# Patient Record
Sex: MALE | Race: WHITE | URBAN - METROPOLITAN AREA
[De-identification: names, ages, dates, MRNs, and addresses within clinical notes are randomized per-mention and may not be internally consistent; named-entity substitution may affect disease eponyms.]

---

## 2017-02-15 ENCOUNTER — TELEPHONE (OUTPATIENT)
Dept: ENDOCRINOLOGY | Age: 32
End: 2017-02-15

## 2017-02-15 DIAGNOSIS — E10.65 POOR CONTROL TYPE I DIABETES MELLITUS (HCC): Primary | ICD-10-CM

## 2017-02-15 DIAGNOSIS — E78.5 HYPERLIPIDEMIA, UNSPECIFIED HYPERLIPIDEMIA TYPE: ICD-10-CM

## 2017-02-15 NOTE — TELEPHONE ENCOUNTER
----- Message from Kailash Tang MD sent at 2/15/2017  8:50 AM EST -----      ----- Message -----     From: Kailash Tang MD     Sent: 2/15/2017       To: Kailash Tang MD    Mail him a lab slip

## 2017-03-07 ENCOUNTER — TELEPHONE (OUTPATIENT)
Dept: ENDOCRINOLOGY | Age: 32
End: 2017-03-07

## 2017-03-07 NOTE — TELEPHONE ENCOUNTER
----- Message from Norma Rosen sent at 3/6/2017  5:51 PM EST -----  Regarding: /Prisca   Pt needs a new Rx insulin pump supplies sent to [facility]. The best number for the pt [phone].

## 2017-03-07 NOTE — TELEPHONE ENCOUNTER
Informed patient that I had called him to let him know his pump supplies were taken care of by Dr. Lilia Chapa.

## 2017-03-07 NOTE — TELEPHONE ENCOUNTER
Informed patient by voicemail that his pump supplies were taken care of and that he could  call back if he has any questions.

## 2017-03-16 ENCOUNTER — TELEPHONE (OUTPATIENT)
Dept: ENDOCRINOLOGY | Age: 32
End: 2017-03-16

## 2017-03-16 NOTE — TELEPHONE ENCOUNTER
----- Message from Rebekah Murry sent at 3/15/2017  7:21 PM EDT -----  Regarding: Dr. Sandy Major  Patient called to reschedule appointment but none was available until August, 2017. Patient is requesting a returned call back to schedule an earlier appointment. Best contact number for patient is [phone].

## 2017-03-16 NOTE — TELEPHONE ENCOUNTER
3/16/2017  4:25 PM    I sent Mr. Burnett a Jovie message at 9:52am stating to call the office at [phone] option-1 the message was read at 10:19am as of 4:29pm no call  from Mr. Burnett. Unable to leave a  because mailbox is full. Here are the times I tried calling Mr. Burnett  9:50am  10:29am  12:09pm  2:56pm  4:21pm    ThanksQiana

## 2017-03-16 NOTE — TELEPHONE ENCOUNTER
----- Message from Maxwell Rigo sent at 3/15/2017  7:21 PM EDT -----  Regarding: Dr. Harris   Patient called to reschedule appointment but none was available until August, 2017. Patient is requesting a returned call back to schedule an earlier appointment. Best contact number for patient is [phone].

## 2017-03-16 NOTE — TELEPHONE ENCOUNTER
Please schedule him for a Tuesday morning between 8:30-9:30 or a 12:10 slot anytime in the next few weeks aside from 4/11/17 at 8:30.

## 2017-03-20 RX ORDER — INSULIN ASPART 100 [IU]/ML
INJECTION, SOLUTION INTRAVENOUS; SUBCUTANEOUS
Qty: 70 ML | Refills: 3 | Status: SHIPPED | OUTPATIENT
Start: 2017-03-20 | End: 2018-04-23 | Stop reason: SDUPTHER

## 2017-04-11 ENCOUNTER — OFFICE VISIT (OUTPATIENT)
Dept: ENDOCRINOLOGY | Age: 32
End: 2017-04-11

## 2017-04-11 VITALS
HEIGHT: 73 IN | SYSTOLIC BLOOD PRESSURE: 106 MMHG | BODY MASS INDEX: 25.62 KG/M2 | DIASTOLIC BLOOD PRESSURE: 55 MMHG | WEIGHT: 193.3 LBS | HEART RATE: 71 BPM

## 2017-04-11 DIAGNOSIS — E78.5 OTHER AND UNSPECIFIED HYPERLIPIDEMIA: ICD-10-CM

## 2017-04-11 NOTE — PROGRESS NOTES
Chief Complaint   Patient presents with    Diabetes     pcp and pharmacy confirmed     History of Present Illness: Desiree Black is a 34 y.o. male here for follow up of diabetes. Weight up 5 lbs since last visit in 11/16. Current settings are as follows:  - basal: 12a: 1.65, 4a: 1.35  - Carb ratio: 10  - sensitivity: 12a: 40, 6a: 25, 10p: 40  - target:   - active insulin time: 4 hours    Didn't make any other changes to his pump since last visit. Does think he has had more foods higher in cholesterol like fast food since last visit to explain the rise in cholesterol. Had been eating more protein this winter but has gotten off this the past 2 months. Has been checking some days up to 4 times a day but still can go 2-3 days without checking at all but will just bolus. Does have some evidence of good readings in the 130-180 range when he checks several times a day but still has spikes in the 200-400 range frequently that are keeping up his A1c. No pattern of lows. Never got a chance to make an eye appt so gave him contact info again for Dr. Heidi Pichardo. Current Outpatient Prescriptions   Medication Sig    NOVOLOG 100 unit/mL injection USE AS DIRECTED FOR INSULIN PUMP. MAX UNITS 75 PER DAY    ONETOUCH ULTRA TEST strip Test up to 4 times daily. Dx: E10.65    SUBCUTANEOUS INSULIN PUMP (MINIMED INSULIN INFUSION PUMP) by Does Not Apply route. No current facility-administered medications for this visit. No Known Allergies     Review of Systems:  - Eyes: no blurry vision or double vision  - Cardiovascular: no chest pain  - Respiratory: no shortness of breath  - Musculoskeletal: no myalgias  - Neurological: no numbness/tingling in extremities    Physical Examination:  Blood pressure 106/55, pulse 71, height 6' 1\" (1.854 m), weight 193 lb 4.8 oz (87.7 kg).   - General: pleasant, no distress, good eye contact   - Neck: no carotid bruits  - Cardiovascular: regular, normal rate, nl s1 and s2, no m/r/g, - Respiratory: clear bilaterally  - Integumentary: no edema,   - Psychiatric: normal mood and affect    Data Reviewed:   - Hgb A1c 8.2%  - lipids: total 234 ,  HDL 59, TG 71,   - ALT 16, AST 20  - BUN/Cr 15/0.74      Assessment/Plan:     1. Type I (juvenile type) diabetes mellitus without mention of complication, uncontrolled ([facility] [address]): his most recent Hgb A1c was 8.2% in 4/17 down from 8.4% in 11/16 down from 8.5% in 7/15 up from 7.9% in 3/15. Overall control is improving but still needs to check more frequently to determine where changes need to be made to his settings. We spent 25 minutes of face to face time together and > 50% of the time was spent in counseling on diabetes management. - cont current pump settings   - check bs 4 times per day due to fluctuating sugars on an insulin pump  - foot exam done 11/16  - optho is now due  - microalbumin nl 11/16  - check A1c and bmp prior to next visit  - his BP was at goal < 140/90 not on any meds  - TSH normal 7/15     2. Other and unspecified hyperlipidemia: Given DM, Goal LDL < 100, non-HDL < 130, and TG < 150.  in 3/15 and 125 in 7/15. Down to 122 in 11/16 but up to 161 in 4/17 but 10 year risk of CV disease is 0.5% so will hold on statin and focus on diet. - diet control for now  - check lipids prior to next visit        Patient Instructions   1) Your Hemoglobin A1c (3 month test of blood sugar) was 8.2% which is the best it's been since July 2015. We'll keep your settings the same. Continue to focus on checking at least 2 times per day and up to 4 times per day to continue to improve your control. If you notice that your sugars are spiking after meals, be sure you are counting carbs correctly but if still running high over 180 after a meal, consider changing the carb ratio to 8. Consider using some prepacked meals to ensure the carb count is correct. 2) Your LDL (bad cholesterol) is 162 and goal is under 100.   Try to limit the amount of fried foods, fatty foods, butter, gravy, red meat, ice cream, cheese and eggs in your diet, which are all high in cholesterol. You will not need any meds for cholesterol at this time. 3) Your liver and kidney tests are normal.    4) I will send you a reminder letter 3-4 weeks prior to your next visit and you will have the order already in the [facility] system so you can just go sometime in the 3-7 days before the next visit to have your labs drawn. Follow-up Disposition:  Return in about 5 months (around 9/11/2017).     Copy sent to:  Dr. Tatum Amos

## 2017-04-11 NOTE — PATIENT INSTRUCTIONS
1) Your Hemoglobin A1c (3 month test of blood sugar) was 8.2% which is the best it's been since July 2015. We'll keep your settings the same. Continue to focus on checking at least 2 times per day and up to 4 times per day to continue to improve your control. If you notice that your sugars are spiking after meals, be sure you are counting carbs correctly but if still running high over 180 after a meal, consider changing the carb ratio to 8. Consider using some prepacked meals to ensure the carb count is correct. 2) Your LDL (bad cholesterol) is 162 and goal is under 100. Try to limit the amount of fried foods, fatty foods, butter, gravy, red meat, ice cream, cheese and eggs in your diet, which are all high in cholesterol. You will not need any meds for cholesterol at this time. 3) Your liver and kidney tests are normal.    4) I will send you a reminder letter 3-4 weeks prior to your next visit and you will have the order already in the [facility] system so you can just go sometime in the 3-7 days before the next visit to have your labs drawn. 5) You can try [facility] at [place] [phone].

## 2017-04-11 NOTE — MR AVS SNAPSHOT
Visit Information Date & Time Provider Department Dept. Phone Encounter #  
 4/11/2017 12:10 PM Robin Quick, [address] [facility] Diabetes and Endocrinology [phone] [id number] Follow-up Instructions Return in about 5 months (around 9/11/2017). Upcoming Health Maintenance Date Due  
 EYE EXAM RETINAL OR DILATED Q1 2/27/1994 Pneumococcal 19-64 Medium Risk (1 of 1 - PPSV23) 2/27/2003 DTaP/Tdap/Td series (1 - Tdap) 2/27/2005 INFLUENZA AGE 9 TO ADULT 8/1/2016 HEMOGLOBIN A1C Q6M 5/11/2017 FOOT EXAM Q1 11/11/2017 MICROALBUMIN Q1 11/11/2017 LIPID PANEL Q1 11/11/2017 Allergies as of 4/11/2017  Review Complete On: 4/11/2017 By: Robin Quick MD  
 No Known Allergies Current Immunizations  Never Reviewed No immunizations on file. Not reviewed this visit Vitals BP Pulse Height(growth percentile) Weight(growth percentile) BMI Smoking Status 106/55 (BP 1 Location: Left arm, BP Patient Position: Sitting) 71 6' 1\" (1.854 m) 193 lb 4.8 oz (87.7 kg) 25.5 kg/m2 Former Smoker Vitals History BMI and BSA Data Body Mass Index Body Surface Area 25.5 kg/m 2 2.13 m 2 Preferred Pharmacy Pharmacy Name Phone [id number] [place], [address] [phone] Your Updated Medication List  
  
   
This list is accurate as of: 4/11/17  1:08 PM.  Always use your most recent med list.  
  
  
  
  
 MINIMED INSULIN INFUSION PUMP  
by Does Not Apply route. NovoLOG 100 unit/mL injection Generic drug:  insulin aspart USE AS DIRECTED FOR INSULIN PUMP. MAX UNITS 75 PER DAY  
  
 ONETOUCH ULTRA TEST strip Generic drug:  glucose blood VI test strips Test up to 4 times daily. Dx: E10.65 Follow-up Instructions Return in about 5 months (around 9/11/2017). Patient Instructions 1) Your Hemoglobin A1c (3 month test of blood sugar) was 8.2% which is the best it's been since July 2015. We'll keep your settings the same. Continue to focus on checking at least 2 times per day and up to 4 times per day to continue to improve your control. If you notice that your sugars are spiking after meals, be sure you are counting carbs correctly but if still running high over 180 after a meal, consider changing the carb ratio to 8. Consider using some prepacked meals to ensure the carb count is correct. 2) Your LDL (bad cholesterol) is 162 and goal is under 100. Try to limit the amount of fried foods, fatty foods, butter, gravy, red meat, ice cream, cheese and eggs in your diet, which are all high in cholesterol. You will not need any meds for cholesterol at this time. 3) Your liver and kidney tests are normal. 
 
4) I will send you a reminder letter 3-4 weeks prior to your next visit and you will have the order already in the [facility] system so you can just go sometime in the 3-7 days before the next visit to have your labs drawn. Introducing hospitals [facility] HE[facility]! Dear Spencer All: Thank you for requesting a Babble account. Our records indicate that you already have an active Babble account. You can access your account anytime at [email or web address] Did you know that you can access your hospital and ER discharge instructions at any time in Babble? You can also review all of your test results from your hospital stay or ER visit. Additional Information If you have questions, please visit the Frequently Asked Questions section of the Babble website at [email or web address] Remember, Babble is NOT to be used for urgent needs. For medical emergencies, dial 911. Now available from your iPhone and Android! Please provide this summary of care documentation to your next provider. Your primary care clinician is listed as Arianna Coughlin. If you have any questions after today's visit, please call [phone].

## 2017-06-14 RX ORDER — BLOOD SUGAR DIAGNOSTIC
STRIP MISCELLANEOUS
Qty: 400 STRIP | Refills: 3 | Status: SHIPPED | OUTPATIENT
Start: 2017-06-14 | End: 2018-08-26 | Stop reason: SDUPTHER

## 2017-08-09 DIAGNOSIS — E78.5 OTHER AND UNSPECIFIED HYPERLIPIDEMIA: ICD-10-CM

## 2017-09-15 LAB
BUN SERPL-MCNC: 12 MG/DL (ref 6–20)
BUN/CREAT SERPL: 15 (ref 9–20)
CALCIUM SERPL-MCNC: 9.5 MG/DL (ref 8.7–10.2)
CHLORIDE SERPL-SCNC: 103 MMOL/L (ref 96–106)
CHOLEST SERPL-MCNC: 234 MG/DL (ref 100–199)
CO2 SERPL-SCNC: 28 MMOL/L (ref 18–29)
CREAT SERPL-MCNC: 0.81 MG/DL (ref 0.76–1.27)
EST. AVERAGE GLUCOSE BLD GHB EST-MCNC: 183 MG/DL
GLUCOSE SERPL-MCNC: 100 MG/DL (ref 65–99)
HBA1C MFR BLD: 8 % (ref 4.8–5.6)
HDLC SERPL-MCNC: 66 MG/DL
LDLC SERPL CALC-MCNC: 158 MG/DL (ref 0–99)
POTASSIUM SERPL-SCNC: 4.3 MMOL/L (ref 3.5–5.2)
SODIUM SERPL-SCNC: 144 MMOL/L (ref 134–144)
TRIGL SERPL-MCNC: 48 MG/DL (ref 0–149)
VLDLC SERPL CALC-MCNC: 10 MG/DL (ref 5–40)

## 2017-09-19 ENCOUNTER — OFFICE VISIT (OUTPATIENT)
Dept: ENDOCRINOLOGY | Age: 32
End: 2017-09-19

## 2017-09-19 VITALS
HEART RATE: 74 BPM | SYSTOLIC BLOOD PRESSURE: 123 MMHG | HEIGHT: 73 IN | BODY MASS INDEX: 25.87 KG/M2 | DIASTOLIC BLOOD PRESSURE: 73 MMHG | WEIGHT: 195.2 LBS

## 2017-09-19 DIAGNOSIS — E78.5 OTHER AND UNSPECIFIED HYPERLIPIDEMIA: ICD-10-CM

## 2017-09-19 NOTE — PROGRESS NOTES
Chief Complaint   Patient presents with    Diabetes     pcp and pharmacy confirmed     History of Present Illness: Trisha Ozuna is a 34 y.o. male here for follow up of diabetes. Weight up 2 lbs since last visit in 4/17. Current settings are as follows:  - basal: 12a: 1.65, 4a: 1.35  - Carb ratio: 12 (not 10 as I had written in my last note)  - sensitivity: 12a: 40, 6a: 25, 10p: 40  - target:   - active insulin time: 4 hours    Just got  one month ago and went to [facility] for a mini-honeymoon. When I reviewed his pump settings it appears his carb ratio was on 12 for some reason and not 10 and therefore is having a lot of spikes after he eats into the 200s but does have many after dinner in the 300s and some over 400. He does come down overnight into the 100s. Did have a lot of seafood during his trip to [facility]. Current Outpatient Prescriptions   Medication Sig    ONETOUCH ULTRA TEST strip Test up to 4 times daily. Dx: E10.65    NOVOLOG 100 unit/mL injection USE AS DIRECTED FOR INSULIN PUMP. MAX UNITS 75 PER DAY    SUBCUTANEOUS INSULIN PUMP (MINIMED INSULIN INFUSION PUMP) by Does Not Apply route. No current facility-administered medications for this visit. No Known Allergies     Review of Systems:  - Eyes: no blurry vision or double vision  - Cardiovascular: no chest pain  - Respiratory: no shortness of breath  - Musculoskeletal: no myalgias  - Neurological: no numbness/tingling in extremities    Physical Examination:  Blood pressure 123/73, pulse 74, height 6' 1\" (1.854 m), weight 195 lb 3.2 oz (88.5 kg).   - General: pleasant, no distress, good eye contact   - Neck: no carotid bruits  - Cardiovascular: regular, normal rate, nl s1 and s2, no m/r/g,   - Respiratory: clear bilaterally  - Integumentary: no edema,   - Psychiatric: normal mood and affect         Diabetic foot exam performed by Eva Godinez MD     Measurement  Response Nurse Comment Physician Comment Monofilament  R - normal sensation with micro filament  L - normal sensation with micro filament     Pulse DP R - 2+ (normal)  L - 2+ (normal)     Vibration R - normal  L - normal     Structural deformity R - None  L - None     Skin Integrity / Deformity R - Mild - callus  L - Mild - callus        Reviewed by:                 Data Reviewed: Component      Latest Ref Rng & Units 9/14/2017 9/14/2017 9/14/2017           7:42 AM  7:42 AM  7:42 AM   Glucose      65 - 99 mg/dL  100 (H)    BUN      6 - 20 mg/dL  12    Creatinine      0.76 - 1.27 mg/dL  0.81    GFR est non-AA      >59 mL/min/1.73  117    GFR est AA      >59 mL/min/1.73  135    BUN/Creatinine ratio      9 - 20  15    Sodium      134 - 144 mmol/L  144    Potassium      3.5 - 5.2 mmol/L  4.3    Chloride      96 - 106 mmol/L  103    CO2      18 - 29 mmol/L  28    Calcium      8.7 - 10.2 mg/dL  9.5    Cholesterol, total      100 - 199 mg/dL 234 (H)     Triglyceride      0 - 149 mg/dL 48     HDL Cholesterol      >39 mg/dL 66     VLDL, calculated      5 - 40 mg/dL 10     LDL, calculated      0 - 99 mg/dL 158 (H)     Hemoglobin A1c, (calculated)      4.8 - 5.6 %   8.0 (H)   Estimated average glucose      mg/dL   183       Assessment/Plan:     1. Type I (juvenile type) diabetes mellitus without mention of complication, uncontrolled ([facility] [address]): his most recent Hgb A1c was 8% in 9/17 down from 8.2% in 4/17 down from 8.4% in 11/16 down from 8.5% in 7/15 up from 7.9% in 3/15. Overall control is improving as he is checking more frequently but needs more insulin for carbs so adjusted his ratios as below. - cont current pump settings asdie from changes below  - check bs 4 times per day due to fluctuating sugars on an insulin pump  - foot exam done 9/17  - optho 6/17  - microalbumin nl 11/16  - check A1c and bmp and microalbumin prior to next visit  - his BP was at goal < 140/90 not on any meds  - TSH normal 7/15     2.  Other and unspecified hyperlipidemia: Given DM, Goal LDL < 100, non-HDL < 130, and TG < 150.  in 3/15 and 125 in 7/15. Down to 122 in 11/16 but up to 161 in 4/17 and 158 in 9/17 but 10 year risk of CV disease is 0.5% so will hold on statin and focus on diet. - diet control for now  - check lipids prior to next visit        Patient Instructions   1) Current settings are as follows:  - basal: 12a: 1.65, 4a: 1.35  - Carb ratio: 12a: 10, 5:30p: 8  - sensitivity: 12a: 40, 6a: 25, 10p: 40  - target:   - active insulin time: 4 hours    It appears your carb ratio was accidentally set at 12 which was giving you less insulin for food than we want. I reset this for breakfast and lunch to 10 and made the dinner more aggressive at 8 to help with spikes after eating. Keep doing your best to check 3-4 times a day before you eat to adjust the doses based on the settings. 2) Your Hemoglobin A1c (3 month test of blood sugar) continues to decline over the past year and hopefully the next one will be under 8% and as close to 7% or less as possible. 3) I will send you a reminder e-mail 3-4 weeks prior to your next visit and you will have the order already in the [facility] system so you can just go sometime in the 3-7 days before the next visit to have your labs drawn. Follow-up Disposition:  Return in about 5 months (around 2/19/2018).     Copy sent to:  Dr. Robson Lopez

## 2017-09-19 NOTE — PATIENT INSTRUCTIONS
1) Current settings are as follows:  - basal: 12a: 1.65, 4a: 1.35  - Carb ratio: 12a: 10, 5:30p: 8  - sensitivity: 12a: 40, 6a: 25, 10p: 40  - target:   - active insulin time: 4 hours    It appears your carb ratio was accidentally set at 12 which was giving you less insulin for food than we want. I reset this for breakfast and lunch to 10 and made the dinner more aggressive at 8 to help with spikes after eating. Keep doing your best to check 3-4 times a day before you eat to adjust the doses based on the settings. 2) Your Hemoglobin A1c (3 month test of blood sugar) continues to decline over the past year and hopefully the next one will be under 8% and as close to 7% or less as possible. 3) I will send you a reminder e-mail 3-4 weeks prior to your next visit and you will have the order already in the [facility] system so you can just go sometime in the 3-7 days before the next visit to have your labs drawn.

## 2017-09-19 NOTE — MR AVS SNAPSHOT
Visit Information Date & Time Provider Department Dept. Phone Encounter #  
 9/19/2017  1:50 PM Jonathon Pickard, [address] [place] [address] Diabetes and Endocrinology [phone] [id number] Follow-up Instructions Return in about 5 months (around 2/19/2018). Upcoming Health Maintenance Date Due Pneumococcal 19-64 Medium Risk (1 of 1 - PPSV23) 2/27/2003 DTaP/Tdap/Td series (1 - Tdap) 2/27/2005 INFLUENZA AGE 9 TO ADULT 8/1/2017 FOOT EXAM Q1 11/11/2017 MICROALBUMIN Q1 11/11/2017 HEMOGLOBIN A1C Q6M 3/14/2018 EYE EXAM RETINAL OR DILATED Q1 6/15/2018 LIPID PANEL Q1 9/14/2018 Allergies as of 9/19/2017  Review Complete On: 9/19/2017 By: Jonathon Pickard MD  
 No Known Allergies Current Immunizations  Never Reviewed No immunizations on file. Not reviewed this visit Vitals BP Pulse Height(growth percentile) Weight(growth percentile) BMI Smoking Status 123/73 74 6' 1\" (1.854 m) 195 lb 3.2 oz (88.5 kg) 25.75 kg/m2 Former Smoker Vitals History BMI and BSA Data Body Mass Index Body Surface Area 25.75 kg/m 2 2.14 m 2 Preferred Pharmacy Pharmacy Name Phone [id number] [place], [address] [phone] Your Updated Medication List  
  
   
This list is accurate as of: 9/19/17  2:37 PM.  Always use your most recent med list.  
  
  
  
  
 MINIMED INSULIN INFUSION PUMP  
by Does Not Apply route. NovoLOG 100 unit/mL injection Generic drug:  insulin aspart USE AS DIRECTED FOR INSULIN PUMP. MAX UNITS 75 PER DAY  
  
 ONETOUCH ULTRA TEST strip Generic drug:  glucose blood VI test strips Test up to 4 times daily. Dx: E10.65 Follow-up Instructions Return in about 5 months (around 2/19/2018). Patient Instructions 1) Current settings are as follows: 
- basal: 12a: 1.65, 4a: 1.35 
- Carb ratio: 12a: 10, 5:30p: 8 - sensitivity: 12a: 40, 6a: 25, 10p: 40 
- target:  
- active insulin time: 4 hours It appears your carb ratio was accidentally set at 12 which was giving you less insulin for food than we want. I reset this for breakfast and lunch to 10 and made the dinner more aggressive at 8 to help with spikes after eating. Keep doing your best to check 3-4 times a day before you eat to adjust the doses based on the settings. 2) Your Hemoglobin A1c (3 month test of blood sugar) continues to decline over the past year and hopefully the next one will be under 8% and as close to 7% or less as possible. 3) I will send you a reminder e-mail 3-4 weeks prior to your next visit and you will have the order already in the [facility] [employer] system so you can just go sometime in the 3-7 days before the next visit to have your labs drawn. Introducing [address]! Dear Ildefonso Benjamin: Thank you for requesting a [employer]ing Services account. Our records indicate that you already have an active [employer] account. You can access your account anytime at [email or web address] Did you know that you can access your hospital and ER discharge instructions at any time in [employer]? You can also review all of your test results from your hospital stay or ER visit. Additional Information If you have questions, please visit the Frequently Asked Questions section of the [employer] website at [email or web address] Remember, [employer] is NOT to be used for urgent needs. For medical emergencies, dial 911. Now available from your iPhone and Android! Please provide this summary of care documentation to your next provider. Your primary care clinician is listed as Antony Smith. If you have any questions after today's visit, please call [phone].

## 2018-01-24 DIAGNOSIS — E78.5 OTHER AND UNSPECIFIED HYPERLIPIDEMIA: ICD-10-CM

## 2018-02-17 LAB
ALBUMIN/CREAT UR: 3.4 MG/G CREAT (ref 0–30)
BUN SERPL-MCNC: 11 MG/DL (ref 6–20)
BUN/CREAT SERPL: 13 (ref 9–20)
CALCIUM SERPL-MCNC: 9.7 MG/DL (ref 8.7–10.2)
CHLORIDE SERPL-SCNC: 102 MMOL/L (ref 96–106)
CHOLEST SERPL-MCNC: 210 MG/DL (ref 100–199)
CO2 SERPL-SCNC: 25 MMOL/L (ref 18–29)
CREAT SERPL-MCNC: 0.82 MG/DL (ref 0.76–1.27)
CREAT UR-MCNC: 168.1 MG/DL
EST. AVERAGE GLUCOSE BLD GHB EST-MCNC: 166 MG/DL
GFR SERPLBLD CREATININE-BSD FMLA CKD-EPI: 116 ML/MIN/1.73
GFR SERPLBLD CREATININE-BSD FMLA CKD-EPI: 134 ML/MIN/1.73
GLUCOSE SERPL-MCNC: 58 MG/DL (ref 65–99)
HBA1C MFR BLD: 7.4 % (ref 4.8–5.6)
HDLC SERPL-MCNC: 58 MG/DL
LDLC SERPL CALC-MCNC: 139 MG/DL (ref 0–99)
MICROALBUMIN UR-MCNC: 5.7 UG/ML
POTASSIUM SERPL-SCNC: 4.5 MMOL/L (ref 3.5–5.2)
SODIUM SERPL-SCNC: 144 MMOL/L (ref 134–144)
TRIGL SERPL-MCNC: 66 MG/DL (ref 0–149)
VLDLC SERPL CALC-MCNC: 13 MG/DL (ref 5–40)

## 2018-02-20 ENCOUNTER — OFFICE VISIT (OUTPATIENT)
Dept: ENDOCRINOLOGY | Age: 33
End: 2018-02-20

## 2018-02-20 VITALS
DIASTOLIC BLOOD PRESSURE: 74 MMHG | WEIGHT: 191.4 LBS | SYSTOLIC BLOOD PRESSURE: 115 MMHG | BODY MASS INDEX: 25.37 KG/M2 | HEIGHT: 73 IN | HEART RATE: 70 BPM

## 2018-02-20 DIAGNOSIS — E78.5 HYPERLIPIDEMIA WITH TARGET LOW DENSITY LIPOPROTEIN (LDL) CHOLESTEROL LESS THAN 100 MG/DL: ICD-10-CM

## 2018-02-20 NOTE — PROGRESS NOTES
Chief Complaint   Patient presents with    Diabetes     pcp and pharmacy confirmed     History of Present Illness: Wing Monsalve is a 34 y.o. male here for follow up of diabetes. Weight down 4 lbs since last visit in 9/17. Current settings are as follows:  - basal: 12a: 1.65, 4a: 1.35  - Carb ratio: 12a: 10, 5:30p: 8  - sensitivity: 12a: 40, 6a: 25, 10p: 40  - target:   - active insulin time: 4 hours    Review of pump download shows he is checking up to 4 times daily and can have some low readings in the 50-70s overnight. He is not spiking up as much after dinner but does still have some spikes over 200 after breakfast and lunch. Coming down close to 120 or less after correcting for highs. Current Outpatient Prescriptions   Medication Sig    ONETOUCH ULTRA TEST strip Test up to 4 times daily. Dx: E10.65    NOVOLOG 100 unit/mL injection USE AS DIRECTED FOR INSULIN PUMP. MAX UNITS 75 PER DAY    SUBCUTANEOUS INSULIN PUMP (MINIMED INSULIN INFUSION PUMP) by Does Not Apply route. No current facility-administered medications for this visit. No Known Allergies     Review of Systems:  - Eyes: no blurry vision or double vision  - Cardiovascular: no chest pain  - Respiratory: no shortness of breath  - Musculoskeletal: no myalgias  - Neurological: no numbness/tingling in extremities    Physical Examination:  Blood pressure 115/74, pulse 70, height 6' 1\" (1.854 m), weight 191 lb 6.4 oz (86.8 kg).   - General: pleasant, no distress, good eye contact   - Neck: no carotid bruits  - Cardiovascular: regular, normal rate, nl s1 and s2, no m/r/g,   - Respiratory: clear bilaterally  - Integumentary: no edema,   - Psychiatric: normal mood and affect    Data Reviewed:   Component      Latest Ref Rng & Units 2/16/2018 2/16/2018 2/16/2018 2/16/2018           8:36 AM  8:36 AM  8:36 AM  8:36 AM   Glucose      65 - 99 mg/dL   58 (L)    BUN      6 - 20 mg/dL   11    Creatinine      0.76 - 1.27 mg/dL   0.82    GFR est non-AA      >59 mL/min/1.73   116    GFR est AA      >59 mL/min/1.73   134    BUN/Creatinine ratio      9 - 20   13    Sodium      134 - 144 mmol/L   144    Potassium      3.5 - 5.2 mmol/L   4.5    Chloride      96 - 106 mmol/L   102    CO2      18 - 29 mmol/L   25    Calcium      8.7 - 10.2 mg/dL   9.7    Cholesterol, total      100 - 199 mg/dL    210 (H)   Triglyceride      0 - 149 mg/dL    66   HDL Cholesterol      >39 mg/dL    58   VLDL, calculated      5 - 40 mg/dL    13   LDL, calculated      0 - 99 mg/dL    139 (H)   Creatinine, urine      Not Estab. mg/dL  168.1     Microalbumin, urine      Not Estab. ug/mL  5.7     Microalbumin/Creat. Ratio      0.0 - 30.0 mg/g creat  3.4     Hemoglobin A1c, (calculated)      4.8 - 5.6 % 7.4 (H)      Estimated average glucose      mg/dL 166          Assessment/Plan:     1. Type I (juvenile type) diabetes mellitus without mention of complication, uncontrolled ([facility]): his most recent Hgb A1c was 7.4% in 2/18 down from 8% in 9/17 down from 8.2% in 4/17 down from 8.4% in 11/16 down from 8.5% in 7/15 up from 7.9% in 3/15. Overall control continues to improve but made basal less aggressive at bedtime and carb ratio more aggressive for breakfast and lunch  - cont current pump settings aside from changes below  - check bs 4 times per day due to fluctuating sugars on an insulin pump  - foot exam done 9/17  - optho 6/17  - microalbumin nl 2/18  - check A1c and bmp prior to next visit  - his BP was at goal < 140/90 not on any meds  - TSH normal 7/15     2. Other and unspecified hyperlipidemia: Given DM, Goal LDL < 100, non-HDL < 130, and TG < 150.  in 3/15 and 125 in 7/15. Down to 122 in 11/16 but up to 161 in 4/17 and 158 in 9/17. Down to 139 in 2/18 but 10 year risk of CV disease is 0.5% so will hold on statin and focus on diet.   - diet control for now  - check lipids in 2/20        Patient Instructions   1)  Current settings are as follows:  - basal: 12a: 1.5, 4a: 1.35  - Carb ratio: 12a: 10, 5a: 8  - sensitivity: 12a: 40, 6a: 25, 10p: 40  - target:   - active insulin time: 4 hours     I made your 12a basal less aggressive to avoid lows in the middle of the night and your carb ratio for breakfast and lunch more aggressive going from 10 to 8.    2) Your A1c was 7.4% which was the lowest in 3 years. 3) Your cholesterol has come down and your urine and kidney function are normal.        Follow-up Disposition:  Return in about 5 months (around 7/20/2018).     Copy sent to:  Dr. Cheri Resendez

## 2018-02-20 NOTE — PATIENT INSTRUCTIONS
1)  Current settings are as follows:  - basal: 12a: 1.5, 4a: 1.35  - Carb ratio: 12a: 10, 5a: 8  - sensitivity: 12a: 40, 6a: 25, 10p: 40  - target:   - active insulin time: 4 hours     I made your 12a basal less aggressive to avoid lows in the middle of the night and your carb ratio for breakfast and lunch more aggressive going from 10 to 8.    2) Your A1c was 7.4% which was the lowest in 3 years.     3) Your cholesterol has come down and your urine and kidney function are normal.

## 2018-02-20 NOTE — MR AVS SNAPSHOT
[address] [facility] [address] [id number] [phone] Patient: Omayra Feng MRN: [id number] :1985 Visit Information Date & Time Provider Department Dept. Phone Encounter #  
 2018  3:30 PM Elvia Zuluaga, [address] [place] [address] Diabetes and Endocrinology [phone] [id number] Follow-up Instructions Return in about 5 months (around 2018). Upcoming Health Maintenance Date Due Pneumococcal 19-64 Medium Risk (1 of 1 - PPSV23) 2003 DTaP/Tdap/Td series (1 - Tdap) 2005 Influenza Age 5 to Adult 2017 EYE EXAM RETINAL OR DILATED Q1 6/15/2018 HEMOGLOBIN A1C Q6M 2018 FOOT EXAM Q1 2018 MICROALBUMIN Q1 2019 LIPID PANEL Q1 2019 Allergies as of 2018  Review Complete On: 2018 By: Elvia Zuluaga MD  
 No Known Allergies Current Immunizations  Never Reviewed No immunizations on file. Not reviewed this visit Vitals BP Pulse Height(growth percentile) Weight(growth percentile) BMI Smoking Status 115/74 70 6' 1\" (1.854 m) 191 lb 6.4 oz (86.8 kg) 25.25 kg/m2 Former Smoker Vitals History BMI and BSA Data Body Mass Index Body Surface Area  
 25.25 kg/m 2 2.11 m 2 Preferred Pharmacy Pharmacy Name Phone [id number], [address] [phone] Your Updated Medication List  
  
   
This list is accurate as of: 18  4:04 PM.  Always use your most recent med list.  
  
  
  
  
 MINIMED INSULIN INFUSION PUMP  
by Does Not Apply route. NovoLOG U-100 Insulin aspart 100 unit/mL injection Generic drug:  insulin aspart U-100  
USE AS DIRECTED FOR INSULIN PUMP. MAX UNITS 75 PER DAY  
  
 ONETOUCH ULTRA TEST strip Generic drug:  glucose blood VI test strips Test up to 4 times daily. Dx: E10.65 Follow-up Instructions Return in about 5 months (around 7/20/2018). Patient Instructions 1)  Current settings are as follows: 
- basal: 12a: 1.5, 4a: 1.35 
- Carb ratio: 12a: 10, 5a: 8 
- sensitivity: 12a: 40, 6a: 25, 10p: 40 
- target:  
- active insulin time: 4 hours I made your 12a basal less aggressive to avoid lows in the middle of the night and your carb ratio for breakfast and lunch more aggressive going from 10 to 8. 
 
2) Your A1c was 7.4% which was the lowest in 3 years. 3) Your cholesterol has come down and your urine and kidney function are normal. 
 
 
 
 
  
Introducing [facility]! Dear Nelida Ochoa: Thank you for requesting a [employer] account. Our records indicate that you already have an active [employer] account. You can access your account anytime at [email or web address] Did you know that you can access your hospital and ER discharge instructions at any time in [employer]? You can also review all of your test results from your hospital stay or ER visit. Additional Information If you have questions, please visit the Frequently Asked Questions section of the [employer] website at [email or web address] Remember, [employer] is NOT to be used for urgent needs. For medical emergencies, dial 911. Now available from your iPhone and Android! Please provide this summary of care documentation to your next provider. Your primary care clinician is listed as Johnson Bañuelos. If you have any questions after today's visit, please call [phone].

## 2018-04-23 RX ORDER — INSULIN ASPART 100 [IU]/ML
INJECTION, SOLUTION INTRAVENOUS; SUBCUTANEOUS
Qty: 70 ML | Refills: 3 | Status: SHIPPED | OUTPATIENT
Start: 2018-04-23 | End: 2018-06-04 | Stop reason: SDUPTHER

## 2018-06-04 RX ORDER — INSULIN ASPART 100 [IU]/ML
INJECTION, SOLUTION INTRAVENOUS; SUBCUTANEOUS
Qty: 70 ML | Refills: 3 | Status: SHIPPED | OUTPATIENT
Start: 2018-06-04 | End: 2019-08-30 | Stop reason: SDUPTHER

## 2018-07-03 LAB
BUN SERPL-MCNC: 10 MG/DL (ref 6–20)
BUN/CREAT SERPL: 13 (ref 9–20)
CALCIUM SERPL-MCNC: 9.3 MG/DL (ref 8.7–10.2)
CHLORIDE SERPL-SCNC: 104 MMOL/L (ref 96–106)
CO2 SERPL-SCNC: 24 MMOL/L (ref 20–29)
CREAT SERPL-MCNC: 0.79 MG/DL (ref 0.76–1.27)
EST. AVERAGE GLUCOSE BLD GHB EST-MCNC: 177 MG/DL
GLUCOSE SERPL-MCNC: 197 MG/DL (ref 65–99)
HBA1C MFR BLD: 7.8 % (ref 4.8–5.6)
POTASSIUM SERPL-SCNC: 4.5 MMOL/L (ref 3.5–5.2)
SODIUM SERPL-SCNC: 144 MMOL/L (ref 134–144)

## 2018-07-23 ENCOUNTER — OFFICE VISIT (OUTPATIENT)
Dept: ENDOCRINOLOGY | Age: 33
End: 2018-07-23

## 2018-07-23 VITALS
HEIGHT: 73 IN | SYSTOLIC BLOOD PRESSURE: 126 MMHG | BODY MASS INDEX: 25.37 KG/M2 | DIASTOLIC BLOOD PRESSURE: 74 MMHG | HEART RATE: 74 BPM | WEIGHT: 191.4 LBS

## 2018-07-23 DIAGNOSIS — E78.5 HYPERLIPIDEMIA WITH TARGET LOW DENSITY LIPOPROTEIN (LDL) CHOLESTEROL LESS THAN 100 MG/DL: ICD-10-CM

## 2018-07-23 NOTE — PROGRESS NOTES
Chief Complaint   Patient presents with    Diabetes     eye exam is due    Other     pcp and pharmacy confirmed     History of Present Illness: Raffaele Montgomery is a 28 y.o. male here for follow up of diabetes. Weight stable since last visit in 2/18. Current settings are as follows:  - basal: 12a: 1.5, 4a: 1.35  - Carb ratio: 12a: 10, 5a: 8  - sensitivity: 12a: 40, 6a: 25, 10p: 40  - target:   - active insulin time: 4 hours     He has not made any other changes to settings since last visit. He admits to sometimes eating before checking and bolusing and then will check after he eats and bolus at that time and his sugar has already started rising sometimes over 200 which is likely the reason that his A1c is higher. Does have evidence of some good fasting sugars in the  range if he checks before he eats. No particular trends to warrant changes. 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 Current Outpatient Prescriptions   Medication Sig    NOVOLOG U-100 INSULIN ASPART 100 unit/mL injection USE AS DIRECTED FOR INSULIN PUMP. MAX UNITS 75 PER DAY    ONETOUCH ULTRA TEST strip Test up to 4 times daily. Dx: E10.65    SUBCUTANEOUS INSULIN PUMP (MINIMED INSULIN INFUSION PUMP) by Does Not Apply route. No current facility-administered medications for this visit. No Known Allergies     Review of Systems:  - Eyes: no blurry vision or double vision  - Cardiovascular: no chest pain  - Respiratory: no shortness of breath  - Musculoskeletal: no myalgias  - Neurological: no numbness/tingling in extremities    Physical Examination:  Blood pressure 126/74, pulse 74, height 6' 1\" (1.854 m), weight 191 lb 6.4 oz (86.8 kg).   - General: pleasant, no distress, good eye contact   - Neck: no carotid bruits  - Cardiovascular: regular, normal rate, nl s1 and s2, no m/r/g,   - Respiratory: clear Pre-Surgery Instructions:   Medication Instructions    Naproxen Sodium (ALEVE PO) Patient was instructed by Physician and understands   Probiotic Product (PROBIOTIC DAILY PO) Instructed patient per Anesthesia Guidelines  No meds needed am of surgery  per anesthesia  bilaterally  - Integumentary: no edema,   - Psychiatric: normal mood and affect    Data Reviewed:   Component      Latest Ref Rng & Units 7/2/2018 7/2/2018           7:54 AM  7:54 AM   Glucose      65 - 99 mg/dL  197 (H)   BUN      6 - 20 mg/dL  10   Creatinine      0.76 - 1.27 mg/dL  0.79   GFR est non-AA      >59 mL/min/1.73  117   GFR est AA      >59 mL/min/1.73  135   BUN/Creatinine ratio      9 - 20  13   Sodium      134 - 144 mmol/L  144   Potassium      3.5 - 5.2 mmol/L  4.5   Chloride      96 - 106 mmol/L  104   CO2      20 - 29 mmol/L  24   Calcium      8.7 - 10.2 mg/dL  9.3   Hemoglobin A1c, (calculated)      4.8 - 5.6 % 7.8 (H)    Estimated average glucose      mg/dL 177        Assessment/Plan:     1. Type I (juvenile type) diabetes mellitus without mention of complication, uncontrolled ([facility]): his most recent Hgb A1c was 7.8% in 7/18 up from 7.4% in 2/18 down from 8% in 9/17 down from 8.2% in 4/17 down from 8.4% in 11/16 down from 8.5% in 7/15 up from 7.9% in 3/15. A1c slightly higher due to bolusing after meals so he'll work on this. - cont current pump settings   - check bs 4 times per day due to fluctuating sugars on an insulin pump  - foot exam done 9/17  - optho 6/17  - microalbumin nl 2/18  - check A1c at next visit  - his BP was at goal < 140/90 not on any meds  - TSH normal 7/15     2. Other and unspecified hyperlipidemia: Given DM, Goal LDL < 100, non-HDL < 130, and TG < 150.  in 3/15 and 125 in 7/15. Down to 122 in 11/16 but up to 161 in 4/17 and 158 in 9/17. Down to 139 in 2/18 but 10 year risk of CV disease is 0.5% so will hold on statin and focus on diet. - diet control for now  - check lipids in 2/20        Patient Instructions   1) Your A1c was slightly higher at 7.8% up from 7.4% possibly due to spikes from not checking before eating and dosing before the food so we'll focus on this and won't make any changes to your settings.     2) BUN and creatinine are markers of kidney function. Your values are normal.    3) Next time you will just come to the office and we'll check your A1c at the visit. 4) Let me know when you need more strips and I'll take care of them. Follow-up Disposition:  Return in about 5 months (around 12/23/2018).     Copy sent to:  Dr. Elda Mcghee

## 2018-07-23 NOTE — MR AVS SNAPSHOT
[address] [address] [phone] Patient: Kaleb Walker MRN: [id number] :1985 Visit Information Date & Time Provider Department Dept. Phone Encounter #  
 2018  3:30 PM Nam Meza, [address] [place] [address] Diabetes and Endocrinology [phone] Follow-up Instructions Return in about 5 months (around 2018). Upcoming Health Maintenance Date Due Pneumococcal 19-64 Medium Risk (1 of 1 - PPSV23) 2003 DTaP/Tdap/Td series (1 - Tdap) 2005 EYE EXAM RETINAL OR DILATED Q1 6/15/2018 Influenza Age 5 to Adult 2018 FOOT EXAM Q1 2018 HEMOGLOBIN A1C Q6M 2019 MICROALBUMIN Q1 2019 LIPID PANEL Q1 2019 Allergies as of 2018  Review Complete On: 2018 By: Nam Meza MD  
 No Known Allergies Current Immunizations  Never Reviewed No immunizations on file. Not reviewed this visit You Were Diagnosed With   
  
 Codes Comments Uncontrolled type 1 diabetes mellitus without complication ([facility]    -  Primary ICD-10-CM: E10.65 ICD-9-CM: 250.03 Hyperlipidemia with target low density lipoprotein (LDL) cholesterol less than 100 mg/dL     ICD-10-CM: E78.5 ICD-9-CM: 272.4 Vitals BP Pulse Height(growth percentile) Weight(growth percentile) BMI Smoking Status 126/74 74 6' 1\" (1.854 m) 191 lb 6.4 oz (86.8 kg) 25.25 kg/m2 Former Smoker Vitals History BMI and BSA Data Body Mass Index Body Surface Area  
 25.25 kg/m 2 2.11 m 2 Preferred Pharmacy Pharmacy Name Phone [id number], [address] [phone] Your Updated Medication List  
  
   
This list is accurate as of 18  4:32 PM.  Always use your most recent med list.  
  
  
  
  
 MINIMED INSULIN INFUSION PUMP  
by Does Not Apply route. NovoLOG U-100 Insulin aspart 100 unit/mL injection Generic drug:  insulin aspart U-100  
USE AS DIRECTED FOR INSULIN PUMP. MAX UNITS 75 PER DAY  
  
 ONETOUCH ULTRA TEST strip Generic drug:  glucose blood VI test strips Test up to 4 times daily. Dx: E10.65 Follow-up Instructions Return in about 5 months (around 12/23/2018). Patient Instructions 1) Your A1c was slightly higher at 7.8% up from 7.4% possibly due to spikes from not checking before eating and dosing before the food so we'll focus on this and won't make any changes to your settings. 2) BUN and creatinine are markers of kidney function. Your values are normal. 
 
3) Next time you will just come to the office and we'll check your A1c at the visit. 4) Let me know when you need more strips and I'll take care of them. Introducing [facility]! Dear Christina Cristina: Thank you for requesting a [employer] account. Our records indicate that you already have an active [employer] account. You can access your account anytime at [email or web address] Did you know that you can access your hospital and ER discharge instructions at any time in [employer]? You can also review all of your test results from your hospital stay or ER visit. Additional Information If you have questions, please visit the Frequently Asked Questions section of the [employer] website at [email or web address] Remember, [employer] is NOT to be used for urgent needs. For medical emergencies, dial 911. Now available from your iPhone and Android! Please provide this summary of care documentation to your next provider. Your primary care clinician is listed as Fady Bustamante. If you have any questions after today's visit, please call [phone].

## 2018-07-23 NOTE — PATIENT INSTRUCTIONS
1) Your A1c was slightly higher at 7.8% up from 7.4% possibly due to spikes from not checking before eating and dosing before the food so we'll focus on this and won't make any changes to your settings. 2) BUN and creatinine are markers of kidney function. Your values are normal.    3) Next time you will just come to the office and we'll check your A1c at the visit. 4) Let me know when you need more strips and I'll take care of them.

## 2018-12-10 ENCOUNTER — TELEPHONE (OUTPATIENT)
Dept: ENDOCRINOLOGY | Age: 33
End: 2018-12-10

## 2018-12-11 NOTE — TELEPHONE ENCOUNTER
Called patient to reschedule his appointment for 12:10 one to two months out. He wanted something sooner. .  You had an opening at 2:30 tomorrow at [address] [place] [address], so he agreed to take that appointment. Thank you.

## 2018-12-12 ENCOUNTER — OFFICE VISIT (OUTPATIENT)
Dept: ENDOCRINOLOGY | Age: 33
End: 2018-12-12

## 2018-12-12 VITALS
WEIGHT: 193.2 LBS | SYSTOLIC BLOOD PRESSURE: 118 MMHG | HEIGHT: 73 IN | BODY MASS INDEX: 25.6 KG/M2 | HEART RATE: 73 BPM | DIASTOLIC BLOOD PRESSURE: 73 MMHG

## 2018-12-12 DIAGNOSIS — E10.65 UNCONTROLLED TYPE 1 DIABETES MELLITUS WITH HYPERGLYCEMIA (HCC): Primary | ICD-10-CM

## 2018-12-12 DIAGNOSIS — E78.5 HYPERLIPIDEMIA WITH TARGET LOW DENSITY LIPOPROTEIN (LDL) CHOLESTEROL LESS THAN 100 MG/DL: ICD-10-CM

## 2018-12-12 LAB — HBA1C MFR BLD HPLC: 7.7 %

## 2018-12-12 RX ORDER — ESCITALOPRAM OXALATE 10 MG/1
TABLET ORAL
Refills: 0 | COMMUNITY
Start: 2018-12-06 | End: 2018-12-12

## 2018-12-12 RX ORDER — BUPROPION HYDROCHLORIDE 150 MG/1
150 TABLET, EXTENDED RELEASE ORAL 2 TIMES DAILY
Refills: 1 | COMMUNITY
Start: 2018-12-06 | End: 2021-01-12

## 2018-12-12 NOTE — PATIENT INSTRUCTIONS
1) Your A1c is 7.7% down from 7.8% at last check. I think your value is still over 7% due to spikes after meals from not feeling comfortable to bolus before eating. 2) I will make your carb ratio less aggressive so that you are less likely to drop low after eating. 3) Current settings are as follows:  - basal: 12a: 1.5, 4a: 1.35  - Carb ratio: 12a: 10  - sensitivity: 12a: 40, 6a: 25, 10p: 40  - target:   - active insulin time: 4 hours     4) If you are still having lows after eating, then try changing your 4a basal to 1.30 or if needed down to 1.25. 5) Try to check some 2 hours after meal readings to see if they are over 180. If not, then your bolus was correct and if so, make sure you counted correctly but if you did, then you may need to change your carb ratio to 9 so it's more aggressive than 10 but not as much as previously when you were at 8.

## 2018-12-12 NOTE — PROGRESS NOTES
Chief Complaint   Patient presents with    Diabetes     pcp and pharmacy confirmed    Diabetic Foot Exam     due     History of Present Illness: Warren Dudley is a 28 y.o. male here for follow up of diabetes. Weight up 2 lbs since last visit in 7/18. Just started wellbutrin about one week ago and tapered off the lexapro as of yesterday. .   Current settings are as follows:  - basal: 12a: 1.5, 4a: 1.35  - Carb ratio: 12a: 10, 5a: 8  - sensitivity: 12a: 40, 6a: 25, 10p: 40  - target:   - active insulin time: 4 hours     Did try to start checking before eating and bolusing for the food after last visit but was finding that he was having more low sugars a few hours after eating when doing this so about a month ago went back to eating and then checking after eating and correcting for the high readings but not putting in any carbs. Usually would spike to 190-290 after eating. Fasting sugars are in the 130-210 range. May still have an occasional low dosing insulin after eating. Hasn't made any other changes to his settings. Current Outpatient Medications   Medication Sig    buPROPion SR (WELLBUTRIN SR) 150 mg SR tablet TAKE 1 TABLET DAILY FOR 3 DAYS, AND THEN TAKE 1 TAB TWICE DAILY AS DIRECTED ORALLY 30 DAY(S)    ONETOUCH ULTRA BLUE TEST STRIP strip TEST UP TO 4 TIMES DAILY. DX: E10.65    NOVOLOG U-100 INSULIN ASPART 100 unit/mL injection USE AS DIRECTED FOR INSULIN PUMP. MAX UNITS 75 PER DAY    SUBCUTANEOUS INSULIN PUMP (MINIMED INSULIN INFUSION PUMP) by Does Not Apply route. No current facility-administered medications for this visit.       No Known Allergies     Review of Systems:  - Eyes: no blurry vision or double vision  - Cardiovascular: no chest pain  - Respiratory: no shortness of breath  - Musculoskeletal: no myalgias  - Neurological: no numbness/tingling in extremities    Physical Examination:  Blood pressure 118/73, pulse 73, height 6' 1\" (1.854 m), weight 193 lb 3.2 oz (87.6 kg).  - General: pleasant, no distress, good eye contact   - Neck: no carotid bruits  - Cardiovascular: regular, normal rate, nl s1 and s2, no m/r/g,   - Respiratory: clear bilaterally  - Integumentary: no edema,   - Psychiatric: normal mood and affect    Diabetic foot exam:     Left Foot:   Visual Exam: normal    Pulse DP: 2+ (normal)   Filament test: normal sensation    Vibratory sensation: normal      Right Foot:   Visual Exam: normal    Pulse DP: 2+ (normal)   Filament test: normal sensation    Vibratory sensation: normal        Data Reviewed:   Component      Latest Ref Rng & Units 12/12/2018          10:31 AM   Hemoglobin A1c (POC)      % 7.7       Assessment/Plan:     1. Type I (juvenile type) diabetes mellitus without mention of complication, uncontrolled ([facility]): his most recent Hgb A1c was 7.7% in 12/18 down from 7.8% in 7/18 up from 7.4% in 2/18 down from 8% in 9/17 down from 8.2% in 4/17 down from 8.4% in 11/16 down from 8.5% in 7/15 up from 7.9% in 3/15. A1c still up due to bolusing after meals so he'll work on this and I made his carb ratio less aggressive to avoid lows after eating.  - cont current pump settings aside from change below  - check bs 4 times per day due to fluctuating sugars on an insulin pump  - foot exam done 12/18  - optho 6/17  - microalbumin nl 2/18  - check A1c, cmp and microalbumin prior to next visit  - his BP was at goal < 140/90 not on any meds  - TSH normal 7/15     2. Other and unspecified hyperlipidemia: Given DM, Goal LDL < 100, non-HDL < 130, and TG < 150.  in 3/15 and 125 in 7/15. Down to 122 in 11/16 but up to 161 in 4/17 and 158 in 9/17. Down to 139 in 2/18 but 10 year risk of CV disease is 0.5% so will hold on statin and focus on diet. - diet control for now  - check lipids prior to next visit          Patient Instructions   1) Your A1c is 7.7% down from 7.8% at last check.   I think your value is still over 7% due to spikes after meals from not feeling comfortable to bolus before eating. 2) I will make your carb ratio less aggressive so that you are less likely to drop low after eating. 3) Current settings are as follows:  - basal: 12a: 1.5, 4a: 1.35  - Carb ratio: 12a: 10  - sensitivity: 12a: 40, 6a: 25, 10p: 40  - target:   - active insulin time: 4 hours     4) If you are still having lows after eating, then try changing your 4a basal to 1.30 or if needed down to 1.25. 5) Try to check some 2 hours after meal readings to see if they are over 180. If not, then your bolus was correct and if so, make sure you counted correctly but if you did, then you may need to change your carb ratio to 9 so it's more aggressive than 10 but not as much as previously when you were at 8. Follow-up Disposition:  Return in about 5 months (around 5/12/2019).     Copy sent to:  Dr. Armen Burgos

## 2019-08-30 RX ORDER — INSULIN ASPART 100 [IU]/ML
INJECTION, SOLUTION INTRAVENOUS; SUBCUTANEOUS
Qty: 70 ML | Refills: 3 | Status: SHIPPED | OUTPATIENT
Start: 2019-08-30 | End: 2020-10-14 | Stop reason: SDUPTHER

## 2019-10-04 ENCOUNTER — OFFICE VISIT (OUTPATIENT)
Dept: ENDOCRINOLOGY | Age: 34
End: 2019-10-04

## 2019-10-04 VITALS
SYSTOLIC BLOOD PRESSURE: 117 MMHG | OXYGEN SATURATION: 98 % | HEART RATE: 72 BPM | HEIGHT: 73 IN | DIASTOLIC BLOOD PRESSURE: 71 MMHG | WEIGHT: 185 LBS | RESPIRATION RATE: 16 BRPM | BODY MASS INDEX: 24.52 KG/M2

## 2019-10-04 DIAGNOSIS — E10.65 UNCONTROLLED TYPE 1 DIABETES MELLITUS WITH HYPERGLYCEMIA (HCC): Primary | ICD-10-CM

## 2019-10-04 DIAGNOSIS — E78.5 HYPERLIPIDEMIA WITH TARGET LOW DENSITY LIPOPROTEIN (LDL) CHOLESTEROL LESS THAN 100 MG/DL: ICD-10-CM

## 2019-10-04 LAB — HBA1C MFR BLD HPLC: 8.1 %

## 2019-10-04 NOTE — PROGRESS NOTES
Lab Results   Component Value Date/Time    Hemoglobin A1c 7.8 (H) 07/02/2018 07:54 AM    Hemoglobin A1c 7.4 (H) 02/16/2018 08:36 AM    Hemoglobin A1c 8.0 (H) 09/14/2017 07:42 AM

## 2019-10-04 NOTE — PATIENT INSTRUCTIONS
1) Current settings are as follows: 
- basal: 12a: 1.5, 4a: 1.35 
- Carb ratio: 12a: 12 
- sensitivity: 12a: 40, 6a: 25, 10p: 40 
- target:  
- active insulin time: 4 hours I made your carb ratio less aggressive going from 10 to 12 to try and have the pump give less insulin for the food to avoid lows after eating. If you are trying to check before you eat and bolus at that time and still having lows under 90 despite accurate carb counting, then try changing this to 15. 
 
2) If you are not eating anything for 6 or more hours and your sugar is dropping more than 30 points, then your basal rate is too much and you can try decreasing by 0.1 units/hr for the 4a setting if going low during the day or the 12a setting if going low overnight. 3) I will send you a message through [employer] with your lab results. 4) At your next visit, we'll just draw the A1c in the office. Your was 8.1% today.

## 2019-10-04 NOTE — PROGRESS NOTES
Chief Complaint   Patient presents with    Diabetes     History of Present Illness: Funmilayo Atkins is a 27 y.o. male here for follow up of diabetes. Weight down 8 lbs since last visit in 12/18. Current settings are as follows:  - basal: 12a: 1.5, 4a: 1.35  - Carb ratio: 12a: 10  - sensitivity: 12a: 40, 6a: 25, 10p: 40  - target:   - active insulin time: 4 hours     Did not make any further changes to his pump settings after last visit. The longer he has been on the wellbutrin, has found that he is not as hungry and this has helped keep his weight down. Despite making his carb ratio less aggressive, is still having lows after eating sometimes in the 30-70 range so can still be fearful of bolusing before he eats and then can eat first and then check his sugar after he eats and get readings over 200 and then just corrects for this rather than also putting in the food he eats. Does have plenty of good readings in the 100-180 range. Current Outpatient Medications   Medication Sig    NOVOLOG U-100 INSULIN ASPART 100 unit/mL injection USE AS DIRECTED FOR INSULIN PUMP. MAX UNITS 75 PER DAY    buPROPion SR (WELLBUTRIN SR) 150 mg SR tablet TAKE 1 TABLET DAILY FOR 3 DAYS, AND THEN TAKE 1 TAB TWICE DAILY AS DIRECTED ORALLY 30 DAY(S)    ONETOUCH ULTRA BLUE TEST STRIP strip TEST UP TO 4 TIMES DAILY. DX: E10.65    SUBCUTANEOUS INSULIN PUMP (MINIMED INSULIN INFUSION PUMP) by Does Not Apply route. No current facility-administered medications for this visit. No Known Allergies     Review of Systems:  - Eyes: no blurry vision or double vision  - Cardiovascular: no chest pain  - Respiratory: no shortness of breath  - Musculoskeletal: no myalgias  - Neurological: no numbness/tingling in extremities    Physical Examination:  Blood pressure 117/71, pulse 72, resp. rate 16, height 6' 1\" (1.854 m), weight 185 lb (83.9 kg), SpO2 98 %.   - General: pleasant, no distress, good eye contact   - Neck: no carotid bruits  - Cardiovascular: regular, normal rate, nl s1 and s2, no m/r/g,   - Respiratory: clear bilaterally  - Integumentary: no edema,   - Psychiatric: normal mood and affect    Diabetic foot exam:     Left Foot:   Visual Exam: normal    Pulse DP: 2+ (normal)   Filament test: normal sensation    Vibratory sensation: normal      Right Foot:   Visual Exam: normal    Pulse DP: 2+ (normal)   Filament test: normal sensation    Vibratory sensation: normal        Data Reviewed:   Component      Latest Ref Rng & Units 10/4/2019          10:51 AM   Hemoglobin A1c (POC)      % 8.1       Assessment/Plan:     1. Type I (juvenile type) diabetes mellitus without mention of complication, uncontrolled ([facility] [address]): his most recent Hgb A1c was 8.1% in 10/19 up from 7.7% in 12/18 down from 7.8% in 7/18 up from 7.4% in 2/18 down from 8% in 9/17 down from 8.2% in 4/17 down from 8.4% in 11/16 down from 8.5% in 7/15 up from 7.9% in 3/15. A1c still up due to bolusing after meals so he'll work on this and I made his carb ratio less aggressive again to avoid lows after eating. We spent 25 minutes of face to face time together and > 50% of the time was spent in counseling on diabetes management and determining what changes to make to his insulin pump settings. - cont current pump settings aside from change below  - check bs 4 times per day due to fluctuating sugars on an insulin pump  - foot exam done 10/19  - optho 6/17  - microalbumin nl 2/18--repeat today  - check A1c by POC at next visit  - his BP was at goal < 140/90 not on any meds  - TSH normal 7/15     2. Other and unspecified hyperlipidemia: Given DM, Goal LDL < 100, non-HDL < 130, and TG < 150.  in 3/15 and 125 in 7/15. Down to 122 in 11/16 but up to 161 in 4/17 and 158 in 9/17. Down to 139 in 2/18 but 10 year risk of CV disease is 0.5% so will hold on statin and focus on diet.   - diet control for now  - check lipids and cmp today          Patient Instructions   1) Current settings are as follows:  - basal: 12a: 1.5, 4a: 1.35  - Carb ratio: 12a: 12  - sensitivity: 12a: 40, 6a: 25, 10p: 40  - target:   - active insulin time: 4 hours     I made your carb ratio less aggressive going from 10 to 12 to try and have the pump give less insulin for the food to avoid lows after eating. If you are trying to check before you eat and bolus at that time and still having lows under 90 despite accurate carb counting, then try changing this to 15.    2) If you are not eating anything for 6 or more hours and your sugar is dropping more than 30 points, then your basal rate is too much and you can try decreasing by 0.1 units/hr for the 4a setting if going low during the day or the 12a setting if going low overnight. 3) I will send you a message through "[employer] with your lab results. 4) At your next visit, we'll just draw the A1c in the office. Your was 8.1% today. Follow-up and Dispositions    · Return in about 6 months (around 4/4/2020). Copy sent to:  Dr. Norberto Taylor    Lab follow up: 10/12/19  Component      Latest Ref Rng & Units 10/4/2019 10/4/2019 10/4/2019          11:18 AM 11:18 AM 11:18 AM   Glucose      65 - 99 mg/dL  212 (H)    BUN      6 - 20 mg/dL  13    Creatinine      0.76 - 1.27 mg/dL  0.82    GFR est non-AA      >59 mL/min/1.73  115    GFR est AA      >59 mL/min/1.73  132    BUN/Creatinine ratio      9 - 20  16    Sodium      134 - 144 mmol/L  139    Potassium      3.5 - 5.2 mmol/L  4.5    Chloride      96 - 106 mmol/L  99    CO2      20 - 29 mmol/L  25    Calcium      8.7 - 10.2 mg/dL  9.9    Protein, total      6.0 - 8.5 g/dL  6.3    Albumin      3.5 - 5.5 g/dL  4.3    GLOBULIN, TOTAL      1.5 - 4.5 g/dL  2.0    A-G Ratio      1.2 - 2.2  2.2    Bilirubin, total      0.0 - 1.2 mg/dL  0.3    Alk.  phosphatase      39 - 117 IU/L  74    AST      0 - 40 IU/L  14    ALT (SGPT)      0 - 44 IU/L  12    Cholesterol, total      100 - 199 mg/dL 214 (H) Triglyceride      0 - 149 mg/dL 108     HDL Cholesterol      >39 mg/dL 53     VLDL, calculated      5 - 40 mg/dL 22     LDL, calculated      0 - 99 mg/dL 139 (H)     Creatinine, urine      Not Estab. mg/dL   115.7   Microalbumin, urine      Not Estab. ug/mL   4.7   Microalbumin/Creat. Ratio      0.0 - 30.0 mg/g creat   4.1     Sent him the following message through [employer]:    Total Cholesterol is the total number of cholesterol particles in your blood. Goal is less than 200. Triglycerides are the short term fats in your blood. Goal is less than 150. HDL is the good cholesterol in your blood. Goal is more than 50 if you are a woman and 40 if you are a man. LDL is the bad cholesterol in your blood. Goal is less than 100 unless you have heart disease and then goal is under 70. Continue to follow a low cholesterol diet. Try to limit the amount of fried foods, fatty foods, butter, gravy, red meat, ice cream, cheese, and eggs in your diet, which are all high in cholesterol.  -------------------------------------------------------------------------------------------------------------------  BUN and creatinine are markers of kidney function. Your values are normal.  -------------------------------------------------------------------------------------------------------------------  ALT and AST are markers of liver function. Your values are normal.  -------------------------------------------------------------------------------------------------------------------  Microalbumin/creatinine ratio is a marker of the amount of protein in your urine. Goal is less than 30. Your value is normal. This indicates that your kidneys are not being affected by your  diabetes and/or blood pressure.

## 2019-10-05 LAB
ALBUMIN SERPL-MCNC: 4.3 G/DL (ref 3.5–5.5)
ALBUMIN/CREAT UR: 4.1 MG/G CREAT (ref 0–30)
ALBUMIN/GLOB SERPL: 2.2 {RATIO} (ref 1.2–2.2)
ALP SERPL-CCNC: 74 IU/L (ref 39–117)
ALT SERPL-CCNC: 12 IU/L (ref 0–44)
AST SERPL-CCNC: 14 IU/L (ref 0–40)
BILIRUB SERPL-MCNC: 0.3 MG/DL (ref 0–1.2)
BUN SERPL-MCNC: 13 MG/DL (ref 6–20)
BUN/CREAT SERPL: 16 (ref 9–20)
CALCIUM SERPL-MCNC: 9.9 MG/DL (ref 8.7–10.2)
CHLORIDE SERPL-SCNC: 99 MMOL/L (ref 96–106)
CHOLEST SERPL-MCNC: 214 MG/DL (ref 100–199)
CO2 SERPL-SCNC: 25 MMOL/L (ref 20–29)
CREAT SERPL-MCNC: 0.82 MG/DL (ref 0.76–1.27)
CREAT UR-MCNC: 115.7 MG/DL
GLOBULIN SER CALC-MCNC: 2 G/DL (ref 1.5–4.5)
GLUCOSE SERPL-MCNC: 212 MG/DL (ref 65–99)
HDLC SERPL-MCNC: 53 MG/DL
INTERPRETATION, 910389: NORMAL
LDLC SERPL CALC-MCNC: 139 MG/DL (ref 0–99)
MICROALBUMIN UR-MCNC: 4.7 UG/ML
POTASSIUM SERPL-SCNC: 4.5 MMOL/L (ref 3.5–5.2)
PROT SERPL-MCNC: 6.3 G/DL (ref 6–8.5)
SODIUM SERPL-SCNC: 139 MMOL/L (ref 134–144)
TRIGL SERPL-MCNC: 108 MG/DL (ref 0–149)
VLDLC SERPL CALC-MCNC: 22 MG/DL (ref 5–40)

## 2020-07-08 LAB
CREATININE, EXTERNAL: 0.81
HBA1C MFR BLD HPLC: 8.9 %
LDL-C, EXTERNAL: 160

## 2020-07-14 ENCOUNTER — VIRTUAL VISIT (OUTPATIENT)
Dept: ENDOCRINOLOGY | Age: 35
End: 2020-07-14

## 2020-07-14 DIAGNOSIS — E10.65 UNCONTROLLED TYPE 1 DIABETES MELLITUS WITH HYPERGLYCEMIA (HCC): Primary | ICD-10-CM

## 2020-07-14 DIAGNOSIS — E78.5 HYPERLIPIDEMIA WITH TARGET LOW DENSITY LIPOPROTEIN (LDL) CHOLESTEROL LESS THAN 100 MG/DL: ICD-10-CM

## 2020-07-14 NOTE — PROGRESS NOTES
Chief Complaint   Patient presents with    Diabetes     f/u [phone] Doxy Iphone     1. Have you been to the ER, urgent care clinic since your last visit? Hospitalized since your last visit? No    2. Have you seen or consulted any other health care providers outside of the [address] [place] [address] since your last visit? Include any pap smears or colon screening. No     Pt changed insurance companies and would like to discuss new insulin pumps and monitors. Pt also states Novolin is not covered.   [employer]

## 2020-07-14 NOTE — PROGRESS NOTES
Chief Complaint   Patient presents with    Diabetes     f/u [phone] [employer]       **THIS IS A VIRTUAL VISIT VIA A VIDEO SYNCHRONOUS DISCUSSION. PATIENT AGREED TO HAVE THEIR CARE DELIVERED OVER A DOXY. ME VIDEO VISIT IN PLACE OF THEIR REGULARLY SCHEDULED OFFICE VISIT**    History of Present Illness: Ramya Schmitz is a 38 y.o. male here for follow up of diabetes. Current settings are as follows:  - basal: 12a: 1.5, 4a: 1.35  - Carb ratio: 12a: 12  - sensitivity: 12a: 40, 6a: 25, 10p: 40  - target:   - active insulin time: 4 hours     His insurance changed on 7/1/20 and just started as a  at [employer]. He may want to consider switching from the medtronic pump 530G to another pump with CGM. Hasn't made any further changes to his pump settings since last visit. Feels more comfortable trusting the pump to bolus for food and is not going low after eating. No longer having sporadic highs or lows and usually if they go high or low there is a reason. Fasting sugars are in the low 200s when he doesn't test before bed but it can be in the 120-140s when he does test before bed. We agreed to hold on having labs draw as they won't change my management to decrease exposure risk during COVID. Current Outpatient Medications   Medication Sig    NOVOLOG U-100 INSULIN ASPART 100 unit/mL injection USE AS DIRECTED FOR INSULIN PUMP. MAX UNITS 75 PER DAY    buPROPion SR (WELLBUTRIN SR) 150 mg SR tablet Take 150 mg by mouth two (2) times a day.  ONETOUCH ULTRA BLUE TEST STRIP strip TEST UP TO 4 TIMES DAILY. DX: E10.65    SUBCUTANEOUS INSULIN PUMP (MINIMED INSULIN INFUSION PUMP) by Does Not Apply route. No current facility-administered medications for this visit.       No Known Allergies     Review of Systems: PER HPI    Physical Examination:  - GENERAL: NCAT, Appears well nourished   - EYES: EOMI, non-icteric, no proptosis   - Ear/Nose/Throat: NCAT, no visible inflammation or masses   - CARDIOVASCULAR: no cyanosis, no visible JVD   - RESPIRATORY: respiratory effort normal without any distress or labored breathing   - MUSCULOSKELETAL: Normal ROM of neck and upper extremities observed   - SKIN: No rash on face  - NEUROLOGIC:  No facial asymmetry (Cranial nerve 7 motor function), No gaze palsy   - PSYCHIATRIC: Normal affect, Normal insight and judgement       Data Reviewed:   - none new for review    Assessment/Plan:   1. Type I (juvenile type) diabetes mellitus without mention of complication, uncontrolled ([facility] [address]): his most recent Hgb A1c was 8.1% in 10/19 up from 7.7% in 12/18 down from 7.8% in 7/18 up from 7.4% in 2/18 down from 8% in 9/17 down from 8.2% in 4/17 down from 8.4% in 11/16 down from 8.5% in 7/15 up from 7.9% in 3/15. I don't see any particular trends to warrant changes in his settings. I think CGM would be very useful to help him gain better control due to fluctuations in his sugar levels. - cont current pump settings   - check bs 4 times per day due to fluctuating sugars on an insulin pump  - foot exam done 10/19  - optho 6/17  - microalbumin nl 10/19  - check Hgb A1c, cmp, and microalbumin prior to next visit  - his BP was at goal < 140/90 not on any meds  - TSH normal 7/15     2. Other and unspecified hyperlipidemia: Given DM, Goal LDL < 100, non-HDL < 130, and TG < 150.  in 3/15 and 125 in 7/15. Down to 122 in 11/16 but up to 161 in 4/17 and 158 in 9/17. Down to 139 in 2/18 and in 10/19 but 10 year risk of CV disease is 0.5% so will hold on statin and focus on diet. - diet control for now  - check lipids prior to next visit              Patient Instructions   1) I will keep your pump settings the same for now. 2) Farida Quinn is our [employer] rep. His number is [phone] ext [phone]. If that does not work try [phone] to discuss the 670G pump that is integrated with continuous glucose monitor.     3) You can contact Diana Roland who is the [employer] rep at [phone] to see if you can be covered for the pump in the future and this can integrate with Dexcom. 4) Please come for a follow up visit on 1/12/21 at 1:50pm in our [place] office. 5) I will mail you a lab slip. Please put this in the glove compartment or other safe spot where you keep your medical papers and I will send you a reminder to have your labs drawn prior to next visit.               Follow-up and Dispositions    · Return 1/12/21 at 1:50pm.               Copy sent to:  Vianca Ortiz NP    Addendum: 7/16/20  Received labs from PCP dated 7/8/20:  - Hgb A1c 8.9%  - lipids: total 251 ,  HDL 59, ,   - ALT 15, AST 16  - BUN/Cr 12/0.81  :

## 2020-07-14 NOTE — PATIENT INSTRUCTIONS
1) I will keep your pump settings the same for now. 2) Luis Antonio Law is our Medtr[employer] rep. His number is [phone] ext [phone]. If that does not work try [phone] to discuss the 670G pump that is integrated with continuous glucose monitor. 3) You can contact Netta Koyanagi who is the [facility] rep at [phone] to see if you can be covered for the pump in the future and this can integrate with Dexcom. 4) Please come for a follow up visit on 1/12/21 at 1:50pm in our [place] office. 5) I will mail you a lab slip. Please put this in the glove compartment or other safe spot where you keep your medical papers and I will send you a reminder to have your labs drawn prior to next visit.
PAST MEDICAL HISTORY:  Familial Mediterranean fever

## 2020-10-14 RX ORDER — INSULIN ASPART 100 [IU]/ML
INJECTION, SOLUTION INTRAVENOUS; SUBCUTANEOUS
Qty: 70 ML | Refills: 3 | Status: SHIPPED | OUTPATIENT
Start: 2020-10-14 | End: 2020-10-14 | Stop reason: CLARIF

## 2020-10-14 RX ORDER — INSULIN LISPRO 100 [IU]/ML
INJECTION, SOLUTION INTRAVENOUS; SUBCUTANEOUS
Qty: 70 ML | Refills: 3 | Status: SHIPPED | OUTPATIENT
Start: 2020-10-14 | End: 2021-10-27 | Stop reason: SDUPTHER

## 2020-10-14 NOTE — TELEPHONE ENCOUNTER
----- Message from Flaca Coronel sent at 10/14/2020  3:52 PM EDT -----  Regarding: MD Chavez/ telephone  Patient's first and last name: Karen Patel  : 1985    Caller's first and last name: pt    Reason for call: Medication    Callback required yes/no and why: yes     Best contact number(s): [phone]    Details to clarify the request: Pt is calling to request a new prescription for Humalog insulin. Pt insurance no longer cover Novalog. Pt would like a call to confirm request. Pt pharmacy is [facility] on file.

## 2020-12-28 DIAGNOSIS — E78.5 HYPERLIPIDEMIA WITH TARGET LOW DENSITY LIPOPROTEIN (LDL) CHOLESTEROL LESS THAN 100 MG/DL: ICD-10-CM

## 2020-12-28 DIAGNOSIS — E10.65 UNCONTROLLED TYPE 1 DIABETES MELLITUS WITH HYPERGLYCEMIA (HCC): ICD-10-CM

## 2021-01-06 LAB
ALBUMIN SERPL-MCNC: 4.3 G/DL (ref 4–5)
ALBUMIN/CREAT UR: 4 MG/G CREAT (ref 0–29)
ALBUMIN/GLOB SERPL: 2.2 {RATIO} (ref 1.2–2.2)
ALP SERPL-CCNC: 86 IU/L (ref 39–117)
ALT SERPL-CCNC: 16 IU/L (ref 0–44)
AST SERPL-CCNC: 16 IU/L (ref 0–40)
BILIRUB SERPL-MCNC: 0.6 MG/DL (ref 0–1.2)
BUN SERPL-MCNC: 12 MG/DL (ref 6–20)
BUN/CREAT SERPL: 14 (ref 9–20)
CALCIUM SERPL-MCNC: 9.5 MG/DL (ref 8.7–10.2)
CHLORIDE SERPL-SCNC: 102 MMOL/L (ref 96–106)
CHOLEST SERPL-MCNC: 244 MG/DL (ref 100–199)
CO2 SERPL-SCNC: 26 MMOL/L (ref 20–29)
CREAT SERPL-MCNC: 0.85 MG/DL (ref 0.76–1.27)
CREAT UR-MCNC: 281.2 MG/DL
EST. AVERAGE GLUCOSE BLD GHB EST-MCNC: 212 MG/DL
GLOBULIN SER CALC-MCNC: 2 G/DL (ref 1.5–4.5)
GLUCOSE SERPL-MCNC: 174 MG/DL (ref 65–99)
HBA1C MFR BLD: 9 % (ref 4.8–5.6)
HDLC SERPL-MCNC: 55 MG/DL
INTERPRETATION, 910389: NORMAL
LDLC SERPL CALC-MCNC: 169 MG/DL (ref 0–99)
Lab: NORMAL
MICROALBUMIN UR-MCNC: 11.3 UG/ML
POTASSIUM SERPL-SCNC: 4.3 MMOL/L (ref 3.5–5.2)
PROT SERPL-MCNC: 6.3 G/DL (ref 6–8.5)
SODIUM SERPL-SCNC: 141 MMOL/L (ref 134–144)
TRIGL SERPL-MCNC: 114 MG/DL (ref 0–149)
VLDLC SERPL CALC-MCNC: 20 MG/DL (ref 5–40)

## 2021-01-07 ENCOUNTER — TELEPHONE (OUTPATIENT)
Dept: ENDOCRINOLOGY | Age: 36
End: 2021-01-07

## 2021-01-07 NOTE — TELEPHONE ENCOUNTER
----- Message from Eduardo Garcia sent at 1/7/2021  2:07 PM EST -----  Regarding: /Refill  Medication Refill    Caller (if not patient): Jayson      Relationship of caller (if not patient):      Best contact number(s):      Name of medication and dosage if known:insulin pump supply      Is patient out of this medication (yes/no):yes      Pharmacy name:[facility]    Pharmacy listed in chart? (yes/no):  Pharmacy phone 21   ext [phone]      Details to clarify the re Jayson following up on prescription request .       Edurado Garcia

## 2021-01-12 ENCOUNTER — VIRTUAL VISIT (OUTPATIENT)
Dept: ENDOCRINOLOGY | Age: 36
End: 2021-01-12
Payer: COMMERCIAL

## 2021-01-12 DIAGNOSIS — E78.5 HYPERLIPIDEMIA WITH TARGET LOW DENSITY LIPOPROTEIN (LDL) CHOLESTEROL LESS THAN 100 MG/DL: ICD-10-CM

## 2021-01-12 DIAGNOSIS — E10.65 UNCONTROLLED TYPE 1 DIABETES MELLITUS WITH HYPERGLYCEMIA (HCC): Primary | ICD-10-CM

## 2021-01-12 PROCEDURE — 99214 OFFICE O/P EST MOD 30 MIN: CPT | Performed by: INTERNAL MEDICINE

## 2021-01-12 NOTE — PROGRESS NOTES
Chief Complaint   Patient presents with   Zannie Cowden Diabetes     [phone]  doxy-Iphone    Other     pcp and pharmacy confirmed       **THIS IS A VIRTUAL VISIT VIA A VIDEO SYNCHRONOUS DISCUSSION. PATIENT AGREED TO HAVE THEIR CARE DELIVERED OVER A DOXY. ME VIDEO VISIT IN PLACE OF THEIR REGULARLY SCHEDULED OFFICE VISIT**    History of Present Illness: Jonnathan Moore is a 38 y.o. male here for follow up of diabetes. Current settings are as follows:  - basal: 12a: 1.7, 9a: 1.35  - Carb ratio: 12a: 12  - sensitivity: 12a: 30, 6a: 35, 10p: 30  - target:   - active insulin time: 4 hours     Increased his overnight basal from 1.5 to 1.7 and made it last until 9am to help with high sugars in the morning. Fasting sugars are currently between 180-220. Ran out of test strips as he had been using [employer] through his insurance and was testing 3-5 times per day and there was a glitch in the reorder and was out from end of November until last week and used another brand testing 1-2 times a day for a month. When he corrects for high sugars at bedtime, they don't tend to come down overnight. Was eating higher fat foods over the holidays. Will hold on a new pump until the end of 2021 to see if he meets his deductible. Current Outpatient Medications   Medication Sig    insulin lispro (HUMALOG) 100 unit/mL injection USE AS DIRECTED FOR INSULIN PUMP. MAX UNITS 75 PER DAY--replaces novolog    SUBCUTANEOUS INSULIN PUMP (MINIMED INSULIN INFUSION PUMP) by Does Not Apply route.  Eclector[employer]OUCH ULTRA BLUE TEST STRIP strip TEST UP TO 4 TIMES DAILY. DX: E10.65     No current facility-administered medications for this visit.       No Known Allergies     Review of Systems: PER HPI    Physical Examination:  - GENERAL: NCAT, Appears well nourished   - EYES: EOMI, non-icteric, no proptosis   - Ear/Nose/Throat: NCAT, no visible inflammation or masses   - CARDIOVASCULAR: no cyanosis, no visible JVD   - RESPIRATORY: respiratory effort normal without any distress or labored breathing   - MUSCULOSKELETAL: Normal ROM of neck and upper extremities observed   - SKIN: No rash on face  - NEUROLOGIC:  No facial asymmetry (Cranial nerve 7 motor function), No gaze palsy   - PSYCHIATRIC: Normal affect, Normal insight and judgement       Data Reviewed:   Component      Latest Ref Rng & Units 1/5/2021 1/5/2021 1/5/2021 1/5/2021           8:17 AM  8:17 AM  8:17 AM  8:17 AM   Glucose      65 - 99 mg/dL  174 (H)     BUN      6 - 20 mg/dL  12     Creatinine      0.76 - 1.27 mg/dL  0.85     GFR est non-AA      >59 mL/min/1.73  112     GFR est AA      >59 mL/min/1.73  130     BUN/Creatinine ratio      9 - 20  14     Sodium      134 - 144 mmol/L  141     Potassium      3.5 - 5.2 mmol/L  4.3     Chloride      96 - 106 mmol/L  102     CO2      20 - 29 mmol/L  26     Calcium      8.7 - 10.2 mg/dL  9.5     Protein, total      6.0 - 8.5 g/dL  6.3     Albumin      4.0 - 5.0 g/dL  4.3     GLOBULIN, TOTAL      1.5 - 4.5 g/dL  2.0     A-G Ratio      1.2 - 2.2  2.2     Bilirubin, total      0.0 - 1.2 mg/dL  0.6     Alk. phosphatase      39 - 117 IU/L  86     AST      0 - 40 IU/L  16     ALT      0 - 44 IU/L  16     Cholesterol, total      100 - 199 mg/dL 244 (H)      Triglyceride      0 - 149 mg/dL 114      HDL Cholesterol      >39 mg/dL 55      VLDL, calculated      5 - 40 mg/dL 20      LDL, calculated      0 - 99 mg/dL 169 (H)      Creatinine, urine      Not Estab. mg/dL    281.2   Microalbumin, urine      Not Estab. ug/mL    11.3   Microalbumin/Creat. Ratio      0 - 29 mg/g creat    4   Hemoglobin A1c, (calculated)      4.8 - 5.6 %   9.0 (H)    Estimated average glucose      mg/dL   212        Assessment/Plan:     1.  Type I (juvenile type) diabetes mellitus without mention of complication, uncontrolled ([facility] [address]): his most recent Hgb A1c was 9% in 1/21 up from 8.9% in 7/20 up from 8.1% in 10/19 up from 7.7% in 12/18 down from 7.8% in 7/18 up from 7.4% in 2/18 down from 8% in 9/17 down from 8.2% in 4/17 down from 8.4% in 11/16 down from 8.5% in 7/15 up from 7.9% in 3/15. I made his sensitivity more aggressive and lowered his active insulin time to help correct for high sugars better. I think CGM would be very useful to help him gain better control due to fluctuations in his sugar levels. - cont current pump settings aside from changes below  - check bs 4 times per day due to fluctuating sugars on an insulin pump  - foot exam done 10/19  - optho 6/17  - microalbumin nl 1/21  - check Hgb A1c and bmp prior to next visit  - his BP was at goal < 140/90 not on any meds  - TSH normal 7/15     2. Other and unspecified hyperlipidemia: Given DM, Goal LDL < 100, non-HDL < 130, and TG < 150.  in 3/15 and 125 in 7/15. Down to 122 in 11/16 but up to 161 in 4/17 and 158 in 9/17. Down to 139 in 2/18 and in 10/19 and up to 160 in 7/20 and 169 in 1/21 but 10 year risk of CV disease is 0.5% so will hold on statin and focus on diet. - diet control for now  - check lipids prior to next visit              Patient Instructions   1) Current settings are as follows:  - basal: 12a: 1.7, 9a: 1.35  - Carb ratio: 12a: 12  - sensitivity: 12a: 25, 6a: 20, 10p: 25  - target:   - active insulin time: 3 hours     I made your sensitivity and active insulin time more aggressive so hopefully this will help bring you down faster after having high sugars. 2) Your LDL (bad cholesterol) kee from 160 to 169 and goal is under 100 if possible. . Try    3) Your liver and kidney and urine are normal.    4) Please come for a follow up visit on 7/16/21 at 4:10pm in our [place] office. 5) I put an order directly into the [employer] system to repeat your labs in the 1-2 weeks prior to your next visit so just ask for the order under my name and you will receive a courtesy reminder through [facility] to have these drawn.             Follow-up and Dispositions    · Return 7/16/21 at 4:10pm.           Copy sent to:  Aminata Mclaughlin Ben Cueva, NP

## 2021-01-12 NOTE — PATIENT INSTRUCTIONS
1) Current settings are as follows: 
- basal: 12a: 1.7, 9a: 1.35 
- Carb ratio: 12a: 12 
- sensitivity: 12a: 25, 6a: 20, 10p: 25 
- target:  
- active insulin time: 3 hours I made your sensitivity and active insulin time more aggressive so hopefully this will help bring you down faster after having high sugars. 2) Your LDL (bad cholesterol) kee from 160 to 169 and goal is under 100 if possible. . Try 
 
3) Your liver and kidney and urine are normal. 
 
4) Please come for a follow up visit on 7/16/21 at 4:10pm in our [place] office. 5) I put an order directly into the [employer] system to repeat your labs in the 1-2 weeks prior to your next visit so just ask for the order under my name and you will receive a courtesy reminder through [employer] to have these drawn.

## 2021-01-12 NOTE — TELEPHONE ENCOUNTER
I spoke with patient today and informed him that we have not received a fax from [facility]. He will be calling them and have them fax it to my attention.

## 2021-06-17 ENCOUNTER — TELEPHONE (OUTPATIENT)
Dept: ENDOCRINOLOGY | Age: 36
End: 2021-06-17

## 2021-06-17 NOTE — TELEPHONE ENCOUNTER
----- Message from Clarisse Ni sent at 6/17/2021  2:30 PM EDT -----  Regarding: Dr. Nai Wilkinson General Message/Vendor Calls    Caller's first and last name: Boubacar Selby with [facility])      Reason for call: Regarding inquiring practice received prescription form and Dr has signed it.        Call back required yes/no and why: Yes       Best contact number(s): [phone]      Details to clarify the request:      Clarisse Ni

## 2021-06-28 NOTE — TELEPHONE ENCOUNTER
Please call Damian Woodard to let him know I spoke with Carmita Medina and he did not request to switch to Omnipod at this time. Apparently his wife was doing some research on potential pump options to change to in the future but he is not ready to make this switch at this time and I will be discussing this further at his upcoming visit in July and if he chooses to make this switch, I will fax the form back at that time.

## 2021-06-29 NOTE — TELEPHONE ENCOUNTER
I called Will Newman and reached his voice mail. I left a message relaying the information from Dr. Carrie Arredondo and said if he had any further questions, he could give me a call back.   Rik De La Garza

## 2021-07-02 DIAGNOSIS — E10.65 UNCONTROLLED TYPE 1 DIABETES MELLITUS WITH HYPERGLYCEMIA (HCC): ICD-10-CM

## 2021-07-02 DIAGNOSIS — E78.5 HYPERLIPIDEMIA WITH TARGET LOW DENSITY LIPOPROTEIN (LDL) CHOLESTEROL LESS THAN 100 MG/DL: ICD-10-CM

## 2021-07-14 LAB
BUN SERPL-MCNC: 10 MG/DL (ref 6–20)
BUN/CREAT SERPL: 12 (ref 9–20)
CALCIUM SERPL-MCNC: 10 MG/DL (ref 8.7–10.2)
CHLORIDE SERPL-SCNC: 103 MMOL/L (ref 96–106)
CHOLEST SERPL-MCNC: 258 MG/DL (ref 100–199)
CO2 SERPL-SCNC: 24 MMOL/L (ref 20–29)
CREAT SERPL-MCNC: 0.83 MG/DL (ref 0.76–1.27)
EST. AVERAGE GLUCOSE BLD GHB EST-MCNC: 209 MG/DL
GLUCOSE SERPL-MCNC: 95 MG/DL (ref 65–99)
HBA1C MFR BLD: 8.9 % (ref 4.8–5.6)
HDLC SERPL-MCNC: 60 MG/DL
IMP & REVIEW OF LAB RESULTS: NORMAL
LDLC SERPL CALC-MCNC: 180 MG/DL (ref 0–99)
POTASSIUM SERPL-SCNC: 4.7 MMOL/L (ref 3.5–5.2)
SODIUM SERPL-SCNC: 140 MMOL/L (ref 134–144)
TRIGL SERPL-MCNC: 102 MG/DL (ref 0–149)
VLDLC SERPL CALC-MCNC: 18 MG/DL (ref 5–40)

## 2021-07-16 ENCOUNTER — OFFICE VISIT (OUTPATIENT)
Dept: ENDOCRINOLOGY | Age: 36
End: 2021-07-16
Payer: COMMERCIAL

## 2021-07-16 VITALS
HEIGHT: 73 IN | WEIGHT: 201 LBS | DIASTOLIC BLOOD PRESSURE: 66 MMHG | SYSTOLIC BLOOD PRESSURE: 101 MMHG | BODY MASS INDEX: 26.64 KG/M2 | HEART RATE: 60 BPM

## 2021-07-16 DIAGNOSIS — E78.5 HYPERLIPIDEMIA WITH TARGET LOW DENSITY LIPOPROTEIN (LDL) CHOLESTEROL LESS THAN 100 MG/DL: ICD-10-CM

## 2021-07-16 DIAGNOSIS — E10.65 UNCONTROLLED TYPE 1 DIABETES MELLITUS WITH HYPERGLYCEMIA (HCC): Primary | ICD-10-CM

## 2021-07-16 PROCEDURE — 99214 OFFICE O/P EST MOD 30 MIN: CPT | Performed by: INTERNAL MEDICINE

## 2021-07-16 PROCEDURE — 3052F HG A1C>EQUAL 8.0%<EQUAL 9.0%: CPT | Performed by: INTERNAL MEDICINE

## 2021-07-16 NOTE — PROGRESS NOTES
Chief Complaint   Patient presents with    Diabetes     History of Present Illness: Leonidas Rodriguez is a 39 y.o. male here for follow up of diabetes. Current settings are as follows:  - basal: 12a: 1.7, 9a: 1.35  - Carb ratio: 12a: 12  - sensitivity: 12a: 25, 6a: 20, 10p: 25  - target:   - active insulin time: 3 hours     Has not made any further changes to his settings since last visit. His wife is pregnant with a girl and due in October. His son just turned 8. He is working with his wife to get their finances in order and may consider pursuing a pump upgrade later this fall. He admits to not checking his sugars everyday but will still try to bolus for food. Sometimes will feel like his sugar is going low but not actually check it and just eat something with sugar and then when he checks later he can cause it to go up over 200. Other times forgets to bolus for food and this can lead to spikes. Has been drinking more alcohol over the past year. Current Outpatient Medications   Medication Sig    insulin lispro (HUMALOG) 100 unit/mL injection USE AS DIRECTED FOR INSULIN PUMP. MAX UNITS 75 PER DAY--replaces novolog    ONETOUCH ULTRA BLUE TEST STRIP strip TEST UP TO 4 TIMES DAILY. DX: E10.65    SUBCUTANEOUS INSULIN PUMP (MINIMED INSULIN INFUSION PUMP) by Does Not Apply route. No current facility-administered medications for this visit. No Known Allergies     Review of Systems: PER HPI    Physical Examination:  Blood pressure 101/66, pulse 60, height 6' 1\" (1.854 m), weight 201 lb (91.2 kg).   - General: pleasant, no distress, good eye contact   - Neck: no carotid bruits  - Cardiovascular: regular, normal rate, nl s1 and s2, no m/r/g,   - Respiratory: clear bilaterally  - Integumentary: no edema,   - Psychiatric: normal mood and affect    Data Reviewed:   Component      Latest Ref Rng & Units 7/13/2021 7/13/2021 7/13/2021           8:04 AM  8:04 AM  8:04 AM   Glucose      65 - 99 mg/dL 95 BUN      6 - 20 mg/dL 10     Creatinine      0.76 - 1.27 mg/dL 0.83     GFR est non-AA      >59 mL/min/1.73 112     GFR est AA      >59 mL/min/1.73 130     BUN/Creatinine ratio      9 - 20 12     Sodium      134 - 144 mmol/L 140     Potassium      3.5 - 5.2 mmol/L 4.7     Chloride      96 - 106 mmol/L 103     CO2      20 - 29 mmol/L 24     Calcium      8.7 - 10.2 mg/dL 10.0     Cholesterol, total      100 - 199 mg/dL  258 (H)    Triglyceride      0 - 149 mg/dL  102    HDL Cholesterol      >39 mg/dL  60    VLDL, calculated      5 - 40 mg/dL  18    LDL, calculated      0 - 99 mg/dL  180 (H)    Hemoglobin A1c, (calculated)      4.8 - 5.6 %   8.9 (H)   Estimated average glucose      mg/dL   209       Assessment/Plan:     1. Type I (juvenile type) diabetes mellitus without mention of complication, uncontrolled ([facility].): his most recent Hgb A1c was 8.9% in 7/21 down from 9% in 1/21 up from 8.9% in 7/20 up from 8.1% in 10/19 up from 7.7% in 12/18 down from 7.8% in 7/18 up from 7.4% in 2/18 down from 8% in 9/17 down from 8.2% in 4/17 down from 8.4% in 11/16 down from 8.5% in 7/15 up from 7.9% in 3/15. I did not make any changes to his settings as I don't have any data to know where to make changes. I think CGM would be very useful to help him gain better control due to fluctuations in his sugar levels. - cont current pump settings   - check bs 4 times per day due to fluctuating sugars on an insulin pump  - foot exam done 10/19  - optho 6/17  - microalbumin nl 1/21  - check Hgb A1c and cmp and TSH and microalbumin prior to next visit  - his BP was at goal < 140/90 not on any meds  - TSH normal 7/15     2. Other and unspecified hyperlipidemia: Given DM, Goal LDL < 100, non-HDL < 130, and TG < 150.  in 3/15 and 125 in 7/15. Down to 122 in 11/16 but up to 161 in 4/17 and 158 in 9/17.   Down to 139 in 2/18 and in 10/19 and up to 160 in 7/20 and 169 in 1/21 and 180 in 7/21 but 10 year risk of CV disease is 0.5% so will hold on statin and focus on diet. - diet control for now  - check lipids prior to next visit              Patient Instructions   1) The 3 pumps that are on the market with their reps are as follows:    - Omnipod (tubeless pump): Please contact Elvira Coleman for more information on the Omnipod. His number is:  [phone]. - Tandem (t-slim that works with [employer]): You can contact Vidal Cervantes who is the T-slim rep at [phone] with any questions or concerns regarding the Tandem pump. - Medtronic: Shefali Street is our Medtronic rep. His number is [phone] ext [phone]. If that does not work try [phone]. 2) Your LDL (bad cholesterol) was higher at 180. Try to limit the amount of fried foods, fatty foods, butter, gravy, red meat, ice cream, cheese and eggs in your diet, which are all high in cholesterol. 3) BUN and creatinine are markers of kidney function. Your values are normal.    4) Try to test more frequently and not eat or drink sugar unless you know your sugar is low under 90 as your brain needs to get used to lower sugars. If you feel like it's low and it's over 90, then drink a glass of water and have some protein like nuts or cheese or peanut butter. Follow-up and Dispositions    · Return in about 6 months (around 1/16/2022).                Copy sent to:  Gisele Vargas NP

## 2021-07-16 NOTE — PATIENT INSTRUCTIONS
1) The 3 pumps that are on the market with their reps are as follows:    - Omnipod (tubeless pump): Please contact Rishabh Minor for more information on the Omnipod. His number is:  [phone]. - Tandem (t-slim that works with [facility]): You can contact Walt Leonard who is the T-slim rep at [phone] with any questions or concerns regarding the Tandem pump. - Medtronic: Octavia Kaufman is our Medtronic rep. His number is [phone] ext [phone]. If that does not work try [phone]. 2) Your LDL (bad cholesterol) was higher at 180. Try to limit the amount of fried foods, fatty foods, butter, gravy, red meat, ice cream, cheese and eggs in your diet, which are all high in cholesterol. 3) BUN and creatinine are markers of kidney function. Your values are normal.    4) Try to test more frequently and not eat or drink sugar unless you know your sugar is low under 90 as your brain needs to get used to lower sugars. If you feel like it's low and it's over 90, then drink a glass of water and have some protein like nuts or cheese or peanut butter.

## 2021-10-27 RX ORDER — INSULIN LISPRO 100 [IU]/ML
INJECTION, SOLUTION INTRAVENOUS; SUBCUTANEOUS
Qty: 70 ML | Refills: 3 | Status: SHIPPED | OUTPATIENT
Start: 2021-10-27 | End: 2022-09-16 | Stop reason: SDUPTHER

## 2021-11-01 RX ORDER — INSULIN PUMP SYRINGE, 3 ML
EACH MISCELLANEOUS
Qty: 30 EACH | Refills: 3 | Status: SHIPPED | OUTPATIENT
Start: 2021-11-01

## 2022-01-07 DIAGNOSIS — E10.65 UNCONTROLLED TYPE 1 DIABETES MELLITUS WITH HYPERGLYCEMIA (HCC): ICD-10-CM

## 2022-01-07 DIAGNOSIS — E78.5 HYPERLIPIDEMIA WITH TARGET LOW DENSITY LIPOPROTEIN (LDL) CHOLESTEROL LESS THAN 100 MG/DL: ICD-10-CM

## 2022-01-21 LAB
ALBUMIN SERPL-MCNC: 4.2 G/DL (ref 4–5)
ALBUMIN/CREAT UR: 4 MG/G CREAT (ref 0–29)
ALBUMIN/GLOB SERPL: 1.8 {RATIO} (ref 1.2–2.2)
ALP SERPL-CCNC: 96 IU/L (ref 44–121)
ALT SERPL-CCNC: 16 IU/L (ref 0–44)
AST SERPL-CCNC: 18 IU/L (ref 0–40)
BILIRUB SERPL-MCNC: 0.5 MG/DL (ref 0–1.2)
BUN SERPL-MCNC: 15 MG/DL (ref 6–20)
BUN/CREAT SERPL: 17 (ref 9–20)
CALCIUM SERPL-MCNC: 9.4 MG/DL (ref 8.7–10.2)
CHLORIDE SERPL-SCNC: 102 MMOL/L (ref 96–106)
CHOLEST SERPL-MCNC: 243 MG/DL (ref 100–199)
CO2 SERPL-SCNC: 26 MMOL/L (ref 20–29)
CREAT SERPL-MCNC: 0.87 MG/DL (ref 0.76–1.27)
CREAT UR-MCNC: 241.4 MG/DL
EST. AVERAGE GLUCOSE BLD GHB EST-MCNC: 212 MG/DL
GLOBULIN SER CALC-MCNC: 2.4 G/DL (ref 1.5–4.5)
GLUCOSE SERPL-MCNC: 200 MG/DL (ref 65–99)
HBA1C MFR BLD: 9 % (ref 4.8–5.6)
HDLC SERPL-MCNC: 63 MG/DL
IMP & REVIEW OF LAB RESULTS: NORMAL
LDLC SERPL CALC-MCNC: 170 MG/DL (ref 0–99)
MICROALBUMIN UR-MCNC: 10.2 UG/ML
POTASSIUM SERPL-SCNC: 4.9 MMOL/L (ref 3.5–5.2)
PROT SERPL-MCNC: 6.6 G/DL (ref 6–8.5)
SODIUM SERPL-SCNC: 140 MMOL/L (ref 134–144)
TRIGL SERPL-MCNC: 60 MG/DL (ref 0–149)
TSH SERPL-ACNC: 1.61 UIU/ML (ref 0.45–4.5)
VLDLC SERPL CALC-MCNC: 10 MG/DL (ref 5–40)

## 2022-01-24 ENCOUNTER — VIRTUAL VISIT (OUTPATIENT)
Dept: ENDOCRINOLOGY | Age: 37
End: 2022-01-24
Payer: COMMERCIAL

## 2022-01-24 DIAGNOSIS — E10.65 UNCONTROLLED TYPE 1 DIABETES MELLITUS WITH HYPERGLYCEMIA (HCC): Primary | ICD-10-CM

## 2022-01-24 DIAGNOSIS — E78.5 HYPERLIPIDEMIA WITH TARGET LOW DENSITY LIPOPROTEIN (LDL) CHOLESTEROL LESS THAN 100 MG/DL: ICD-10-CM

## 2022-01-24 PROCEDURE — 3052F HG A1C>EQUAL 8.0%<EQUAL 9.0%: CPT | Performed by: INTERNAL MEDICINE

## 2022-01-24 PROCEDURE — 99214 OFFICE O/P EST MOD 30 MIN: CPT | Performed by: INTERNAL MEDICINE

## 2022-01-24 NOTE — PROGRESS NOTES
Chief Complaint   Patient presents with    Diabetes     doxy [phone]    Other     pcp and pharmacy confirmed       **THIS IS A VIRTUAL VISIT VIA A VIDEO SYNCHRONOUS DISCUSSION. PATIENT AGREED TO HAVE THEIR CARE DELIVERED OVER A DOX. ME VIDEO VISIT IN PLACE OF THEIR REGULARLY SCHEDULED OFFICE VISIT**    History of Present Illness: Michael Diaz is a 39 y.o. male here for follow up of diabetes. Her daughter, Susana Herring, was born on 10/8/21 via . Current settings are as follows:  - basal: 12a: 1.9, 9a: 1.4  - Carb ratio: 12a: 12  - sensitivity: 12a: 25, 6a: 20, 10p: 25  - target:   - active insulin time: 3 hours     Just made his basal rates more aggressive in the past month. Still hasn't been checking as frequently as he should and may check twice daily. Fasting sugars are in the  range and they are down from over 200. If he has a higher reading at bedtime will try to correct. Willing to make carb ratio more aggressive. Is interested in looking into the Tandem pump with Dexcom and I think this would be an excellent platform for him to gain better control. he has the following indications to begin treatment with Dexcom:  1) he has type 1 diabetes (E10.65) and is on an intensive insulin regimen with an insulin pump   2) he tests his blood sugar 2-3 times per day and makes treatment decisions off his blood sugar readings and will do the same off dexcom sensor readings  3) he will require frequent adjustments to his insulin pump settings based on his dexcom sensor readings  4) he will benefit from therapeutic continuous glucose monitoring and I recommend that he begin this  5) he is seen in my office every 6 months        Current Outpatient Medications   Medication Sig    Insulin Pump Syringe (MiniMed Syr Res 3cc/22Gx1/2\") 3 mL misc Use as directed every 3 days    insulin lispro (HumaLOG U-100 Insulin) 100 unit/mL injection USE AS DIRECTED FOR INSULIN PUMP.  MAX UNITS 75 PER DAY--REPLACES NOVOLOG    SUBCUTANEOUS INSULIN PUMP (MINIMED INSULIN INFUSION PUMP) by Does Not Apply route.  ONETOUCH ULTRA BLUE TEST STRIP strip TEST UP TO 4 TIMES DAILY. DX: E10.65 (Patient not taking: Reported on 1/24/2022)     No current facility-administered medications for this visit. No Known Allergies     Review of Systems: PER HPI    Physical Examination:  - GENERAL: NCAT, Appears well nourished   - EYES: EOMI, non-icteric, no proptosis   - Ear/Nose/Throat: NCAT, no visible inflammation or masses   - CARDIOVASCULAR: no cyanosis, no visible JVD   - RESPIRATORY: respiratory effort normal without any distress or labored breathing   - MUSCULOSKELETAL: Normal ROM of neck and upper extremities observed   - SKIN: No rash on face  - NEUROLOGIC:  No facial asymmetry (Cranial nerve 7 motor function), No gaze palsy   - PSYCHIATRIC: Normal affect, Normal insight and judgement       Data Reviewed:   Component      Latest Ref Rng & Units 1/20/2022   Glucose      65 - 99 mg/dL 200 (H)   BUN      6 - 20 mg/dL 15   Creatinine      0.76 - 1.27 mg/dL 0.87   GFR est non-AA      >59 mL/min/1.73 110   GFR est AA      >59 mL/min/1.73 127   BUN/Creatinine ratio      9 - 20 17   Sodium      134 - 144 mmol/L 140   Potassium      3.5 - 5.2 mmol/L 4.9   Chloride      96 - 106 mmol/L 102   CO2      20 - 29 mmol/L 26   Calcium      8.7 - 10.2 mg/dL 9.4   Protein, total      6.0 - 8.5 g/dL 6.6   Albumin      4.0 - 5.0 g/dL 4.2   GLOBULIN, TOTAL      1.5 - 4.5 g/dL 2.4   A-G Ratio      1.2 - 2.2 1.8   Bilirubin, total      0.0 - 1.2 mg/dL 0.5   Alk.  phosphatase      44 - 121 IU/L 96   AST      0 - 40 IU/L 18   ALT      0 - 44 IU/L 16   Cholesterol, total      100 - 199 mg/dL 243 (H)   Triglyceride      0 - 149 mg/dL 60   HDL Cholesterol      >39 mg/dL 63   VLDL, calculated      5 - 40 mg/dL 10   LDL, calculated      0 - 99 mg/dL 170 (H)   Creatinine, urine      Not Estab. mg/dL 241.4   Microalbumin, urine      Not Estab. ug/mL 10.2   Microalbumin/Creat. Ratio      0 - 29 mg/g creat 4   Hemoglobin A1c, (calculated)      4.8 - 5.6 % 9.0 (H)   Estimated average glucose      mg/dL 212   TSH      0.450 - 4.500 uIU/mL 1.610         Assessment/Plan:     1. Type I (juvenile type) diabetes mellitus without mention of complication, uncontrolled ([facility]): his most recent Hgb A1c was 9% in 1/22 up from 8.9% in 7/21 down from 9% in 1/21 up from 8.9% in 7/20 up from 8.1% in 10/19 up from 7.7% in 12/18 down from 7.8% in 7/18 up from 7.4% in 2/18 down from 8% in 9/17 down from 8.2% in 4/17 down from 8.4% in 11/16 down from 8.5% in 7/15 up from 7.9% in 3/15. I made his carb ratio more aggressive to help with post-meal spikes. I think CGM would be very useful to help him gain better control due to fluctuations in his sugar levels. - cont current pump settings   - check bs 4 times per day due to fluctuating sugars on an insulin pump  - foot exam done 10/19  - optho 6/17  - microalbumin nl 1/22  - check Hgb A1c and bmp prior to next visit  - his BP was at goal < 140/90 not on any meds  - TSH normal 7/15 and 1.61 in 1/22       2. Other and unspecified hyperlipidemia: Given DM, Goal LDL < 100, non-HDL < 130, and TG < 150.  in 3/15 and 125 in 7/15. Down to 122 in 11/16 but up to 161 in 4/17 and 158 in 9/17. Down to 139 in 2/18 and in 10/19 and up to 160 in 7/20 and 169 in 1/21 and 180 in 7/21 and 170 in 1/22 but 10 year risk of CV disease is 0.5% so will hold on statin and focus on diet. - diet control for now  - check lipids prior to next visit              Patient Instructions   1) Current settings are as follows:  - basal: 12a: 1.9, 9a: 1.4  - Carb ratio: 12a: 10  - sensitivity: 12a: 25, 6a: 20, 10p: 25  - target:   - active insulin time: 3 hours     I made your carb ratio more aggressive at 10 to see if this helps with spikes after meals. 2) Tandem (t-slim that works with [facility]):  You can contact Loan Gutierrez who is the T-slim rep at [phone] with any questions or concerns regarding the Tandem pump. 3) Your kidney and liver and thyroid and urine are normal.    4) Your cholesterol was still high but LDL (bad cholesterol) down from 180 to 170.    5) Please come for a follow up visit on 22 at 3:50pm in our [place] office. 6) I put an order directly into the [employer] system to repeat your labs in the 1-2 weeks prior to your next visit so just ask for the order under my name and you will receive a courtesy reminder through [employer] to have these drawn. Follow-up and Dispositions    · Return 22 at 3:50pm.             Rafael Khan, was evaluated through a synchronous (real-time) audio-video encounter. The patient (or guardian if applicable) is aware that this is a billable service, which includes applicable co-pays. Verbal consent to proceed has been obtained. The visit was conducted pursuant to the emergency declaration under the [address] [place] [address], [address] [place] [address] authority and the [employer] and [employer] General Act. Patient identification was verified, and a caregiver was present when appropriate. The patient was located at home in a state where the provider was licensed to provide care. --Yecenia Chavarria MD on 2022 at 4:42 PM    An electronic signature was used to authenticate this note.       Copy sent to:  Wanda Peoples NP

## 2022-01-24 NOTE — PATIENT INSTRUCTIONS
1) Current settings are as follows:  - basal: 12a: 1.9, 9a: 1.4  - Carb ratio: 12a: 10  - sensitivity: 12a: 25, 6a: 20, 10p: 25  - target:   - active insulin time: 3 hours     I made your carb ratio more aggressive at 10 to see if this helps with spikes after meals. 2) Tandem (t-slim that works with [employer]): You can contact Jerod Manjarrez who is the T-slim rep at [phone] with any questions or concerns regarding the Tandem pump. 3) Your kidney and liver and thyroid and urine are normal.    4) Your cholesterol was still high but LDL (bad cholesterol) down from 180 to 170.    5) Please come for a follow up visit on 8/9/22 at 3:50pm in our [place] office. 6) I put an order directly into the [employer] system to repeat your labs in the 1-2 weeks prior to your next visit so just ask for the order under my name and you will receive a courtesy reminder through [employer] to have these drawn.

## 2022-07-27 DIAGNOSIS — E78.5 HYPERLIPIDEMIA WITH TARGET LOW DENSITY LIPOPROTEIN (LDL) CHOLESTEROL LESS THAN 100 MG/DL: ICD-10-CM

## 2022-07-27 DIAGNOSIS — E10.65 UNCONTROLLED TYPE 1 DIABETES MELLITUS WITH HYPERGLYCEMIA (HCC): ICD-10-CM

## 2022-08-06 LAB
BUN SERPL-MCNC: 12 MG/DL (ref 6–20)
BUN/CREAT SERPL: 15 (ref 9–20)
CALCIUM SERPL-MCNC: 9.8 MG/DL (ref 8.7–10.2)
CHLORIDE SERPL-SCNC: 105 MMOL/L (ref 96–106)
CHOLEST SERPL-MCNC: 263 MG/DL (ref 100–199)
CO2 SERPL-SCNC: 25 MMOL/L (ref 20–29)
CREAT SERPL-MCNC: 0.8 MG/DL (ref 0.76–1.27)
EGFR: 116 ML/MIN/1.73
EST. AVERAGE GLUCOSE BLD GHB EST-MCNC: 232 MG/DL
GLUCOSE SERPL-MCNC: 92 MG/DL (ref 65–99)
HBA1C MFR BLD: 9.7 % (ref 4.8–5.6)
HDLC SERPL-MCNC: 56 MG/DL
IMP & REVIEW OF LAB RESULTS: NORMAL
LDLC SERPL CALC-MCNC: 186 MG/DL (ref 0–99)
POTASSIUM SERPL-SCNC: 4.5 MMOL/L (ref 3.5–5.2)
SODIUM SERPL-SCNC: 143 MMOL/L (ref 134–144)
TRIGL SERPL-MCNC: 120 MG/DL (ref 0–149)
VLDLC SERPL CALC-MCNC: 21 MG/DL (ref 5–40)

## 2022-08-09 ENCOUNTER — OFFICE VISIT (OUTPATIENT)
Dept: ENDOCRINOLOGY | Age: 37
End: 2022-08-09
Payer: COMMERCIAL

## 2022-08-09 VITALS
WEIGHT: 209.8 LBS | SYSTOLIC BLOOD PRESSURE: 129 MMHG | HEIGHT: 73 IN | BODY MASS INDEX: 27.8 KG/M2 | HEART RATE: 62 BPM | DIASTOLIC BLOOD PRESSURE: 77 MMHG

## 2022-08-09 DIAGNOSIS — E10.65 UNCONTROLLED TYPE 1 DIABETES MELLITUS WITH HYPERGLYCEMIA (HCC): Primary | ICD-10-CM

## 2022-08-09 DIAGNOSIS — E78.5 HYPERLIPIDEMIA WITH TARGET LOW DENSITY LIPOPROTEIN (LDL) CHOLESTEROL LESS THAN 100 MG/DL: ICD-10-CM

## 2022-08-09 PROCEDURE — 99214 OFFICE O/P EST MOD 30 MIN: CPT | Performed by: INTERNAL MEDICINE

## 2022-08-09 PROCEDURE — 3046F HEMOGLOBIN A1C LEVEL >9.0%: CPT | Performed by: INTERNAL MEDICINE

## 2022-08-09 NOTE — PROGRESS NOTES
Chief Complaint   Patient presents with    Diabetes     PCP (office) and pharmacy confirmed    Other     Eye exam due     History of Present Illness: Ck Valle is a 44 y.o. male here for follow up of diabetes. Current settings are as follows:  - basal: 12a: 1.9, 9a: 1.4  - Carb ratio: 12a: 10  - sensitivity: 12a: 25, 6a: 20, 10p: 25  - target:   - active insulin time: 3 hours     Has not made any changes to his settings since last visit. 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 Only checking about 1-2 times a day usually before lunch and bedtime and has seen between 130-260 before lunch based on carb counting at breakfast.  Bedtime readings are usually in the 200s. Doesn't think he has necessarily been eating higher fat meals. Thinks his father had high cholesterol but no history of vascular disease in his family that he knows of. Current Outpatient Medications   Medication Sig    Insulin Pump Syringe (MiniMed Syr Res 3cc/22Gx1/2\") 3 mL misc Use as directed every 3 days    insulin lispro (HumaLOG U-100 Insulin) 100 unit/mL injection USE AS DIRECTED FOR INSULIN PUMP. MAX UNITS 75 PER DAY--REPLACES NOVOLOG    SUBCUTANEOUS INSULIN PUMP (MINIMED INSULIN INFUSION PUMP) by Does Not Apply route. ONETOUCH ULTRA BLUE TEST STRIP strip TEST UP TO 4 TIMES DAILY. DX: E10.65 (Patient not taking: No sig reported)     No current facility-administered medications for this visit. No Known Allergies    Review of Systems: PER HPI    Physical Examination:  Blood pressure 129/77, pulse 62, height 6' 1\" (1.854 m), weight 209 lb 12.8 oz (95.2 kg).   General: pleasant, no distress, good eye contact   Neck: no carotid bruits  Cardiovascular: regular, normal rate, nl s1 and s2, no m/r/g,   Respiratory: clear bilaterally  Integumentary: no edema,   Psychiatric: normal mood and affect    Data Reviewed:   Component      Latest Ref Rng & Units 8/5/2022 8/5/2022 8/5/2022          11:46 AM 11:46 AM 11:46 AM   Glucose      65 - 99 mg/dL  92    BUN      6 - 20 mg/dL  12    Creatinine      0.76 - 1.27 mg/dL  0.80    eGFR      >59 mL/min/1.73  116    BUN/Creatinine ratio      9 - 20  15    Sodium      134 - 144 mmol/L  143    Potassium      3.5 - 5.2 mmol/L  4.5    Chloride      96 - 106 mmol/L  105    CO2      20 - 29 mmol/L  25    Calcium      8.7 - 10.2 mg/dL  9.8    Cholesterol, total      100 - 199 mg/dL   263 (H)   Triglyceride      0 - 149 mg/dL   120   HDL Cholesterol      >39 mg/dL   56   VLDL, calculated      5 - 40 mg/dL   21   LDL, calculated      0 - 99 mg/dL   186 (H)   Hemoglobin A1c, (calculated)      4.8 - 5.6 % 9.7 (H)     Estimated average glucose      mg/dL 232           Assessment/Plan:     1. Type I (juvenile type) diabetes mellitus without mention of complication, uncontrolled ([facility] his most recent Hgb A1c was 9.7% in 8/22 up from 9% in 1/22 up from 8.9% in 7/21 down from 9% in 1/21 up from 8.9% in 7/20 up from 8.1% in 10/19 up from 7.7% in 12/18 down from 7.8% in 7/18 up from 7.4% in 2/18 down from 8% in 9/17 down from 8.2% in 4/17 down from 8.4% in 11/16 down from 8.5% in 7/15 up from 7.9% in 3/15. I did not make any changes but encouraged him to test his carb ratio by eating prepackaged meals and checking pre and post-meal sugars. . I think CGM would be very useful to help him gain better control due to fluctuations in his sugar levels. - cont current pump settings   - check bs 4 times per day due to fluctuating sugars on an insulin pump  - foot exam done 10/19  - optho 6/17  - microalbumin nl 1/22  - check Hgb A1c and cmp and microalbumin prior to next visit  - his BP was at goal < 140/90 not on any meds  - TSH normal 7/15 and 1.61 in 1/22       2.  Other and unspecified hyperlipidemia: Given DM, Goal LDL < 100, non-HDL < 130, and TG < 150.  in 3/15 and 125 in 7/15. Down to 122 in 11/16 but up to 161 in 4/17 and 158 in 9/17. Down to 139 in 2/18 and in 10/19 and up to 160 in 7/20 and 169 in 1/21 and 180 in 7/21 and 170 in 1/22 and 186 in 8/22 but 10 year risk of CV disease is 0.5% so will hold on statin and focus on diet. - diet control for now  - check lipids prior to next visit            Patient Instructions   1) Try eating some packaged meals so you know the carb count is correct and check your sugar before you eat and 2 hours after you eat and if your sugar is staying under 180 then you know the carb ratio is correct. If you are going over 180 after meals, then you can try changing your carb ratio to 9 or even 8.      2) Be in touch with your new insurance in terms of ability to consider starting dexcom. 3) Your cholesterol was higher possibly some due to diet but also higher A1c can lead to higher cholesterol. Try to limit the amount of fried foods, fatty foods, butter, gravy, red meat, ice cream, cheese and eggs in your diet, which are all high in cholesterol. 4) BUN and creatinine are markers of kidney function. Your values are normal.    5) Your blood pressure is controlled.     Follow-up and Dispositions    Return 1/31/23 at 3:50pm.               Copy sent to:  Duncan Rivera NP    Lab follow up: 09/16/22  Received labs from his biometric screening from 9/15/22:  - Hgb A1c 9.4%  - lipids: total 260,  HDL 64, ,   - ALT 19, AST 20  - BUN/Cr 13/0.83  - TSH 2.35  - FT4 1.1  - CBC: 5.8<14.6>245

## 2022-08-09 NOTE — PATIENT INSTRUCTIONS
1) Try eating some packaged meals so you know the carb count is correct and check your sugar before you eat and 2 hours after you eat and if your sugar is staying under 180 then you know the carb ratio is correct. If you are going over 180 after meals, then you can try changing your carb ratio to 9 or even 8.      2) Be in touch with your new insurance in terms of ability to consider starting dexcom. 3) Your cholesterol was higher possibly some due to diet but also higher A1c can lead to higher cholesterol. Try to limit the amount of fried foods, fatty foods, butter, gravy, red meat, ice cream, cheese and eggs in your diet, which are all high in cholesterol. 4) BUN and creatinine are markers of kidney function. Your values are normal.    5) Your blood pressure is controlled.

## 2022-09-15 LAB
CREATININE, EXTERNAL: 0.83
HBA1C MFR BLD HPLC: 9.4 %
LDL-C, EXTERNAL: 167

## 2022-09-16 RX ORDER — INSULIN LISPRO 100 [IU]/ML
INJECTION, SOLUTION INTRAVENOUS; SUBCUTANEOUS
Qty: 70 ML | Refills: 3 | Status: SHIPPED | OUTPATIENT
Start: 2022-09-16

## 2022-10-05 RX ORDER — INFUSION SET FOR INSULIN PUMP
INFUSION SETS-PARAPHERNALIA MISCELLANEOUS
Qty: 45 EACH | Refills: 3 | Status: SHIPPED | OUTPATIENT
Start: 2022-10-05

## 2023-01-17 DIAGNOSIS — E10.65 UNCONTROLLED TYPE 1 DIABETES MELLITUS WITH HYPERGLYCEMIA (HCC): ICD-10-CM

## 2023-01-17 DIAGNOSIS — E78.5 HYPERLIPIDEMIA WITH TARGET LOW DENSITY LIPOPROTEIN (LDL) CHOLESTEROL LESS THAN 100 MG/DL: ICD-10-CM

## 2023-01-31 ENCOUNTER — OFFICE VISIT (OUTPATIENT)
Dept: ENDOCRINOLOGY | Age: 38
End: 2023-01-31
Payer: COMMERCIAL

## 2023-01-31 VITALS
BODY MASS INDEX: 28.26 KG/M2 | HEIGHT: 73 IN | SYSTOLIC BLOOD PRESSURE: 118 MMHG | DIASTOLIC BLOOD PRESSURE: 68 MMHG | HEART RATE: 62 BPM | WEIGHT: 213.2 LBS

## 2023-01-31 DIAGNOSIS — E10.65 UNCONTROLLED TYPE 1 DIABETES MELLITUS WITH HYPERGLYCEMIA (HCC): Primary | ICD-10-CM

## 2023-01-31 DIAGNOSIS — E78.5 HYPERLIPIDEMIA WITH TARGET LOW DENSITY LIPOPROTEIN (LDL) CHOLESTEROL LESS THAN 100 MG/DL: ICD-10-CM

## 2023-01-31 PROCEDURE — 99214 OFFICE O/P EST MOD 30 MIN: CPT | Performed by: INTERNAL MEDICINE

## 2023-01-31 PROCEDURE — 3052F HG A1C>EQUAL 8.0%<EQUAL 9.0%: CPT | Performed by: INTERNAL MEDICINE

## 2023-01-31 NOTE — PROGRESS NOTES
Chief Complaint   Patient presents with    Diabetes     PCP and Pharmacy verified     History of Present Illness: Chelly Mercado is a 44 y.o. male here for follow up of diabetes. Weight up 4 lbs since last visit in 8/22. Current settings are as follows:  - basal: 12a: 2.05, 9a: 2.0  - Carb ratio: 12a: 10  - sensitivity: 12a: 20, 6a: 20, 10p: 20  - target:   - active insulin time: 3 hours    He adjusted his basal rates to the ones above and has been able to get his fasting sugars more consistently in the  range. Thinks he may have had pizza the night before the lab draw to explain why his sugar and TGs were so high. Hasn't been checking before each meal and this is leading to higher spikes after he eats so will make carb ratio more aggressive to help with this. Has enjoyed his job as a  at [employer]. May still consider pump upgrade and dexcom in the future but not quite yet. Current Outpatient Medications   Medication Sig    Minimed Jemal Advance Inf Set43\" iset Use as directed with LiquidCompasstronic insulin pump. Change set every 48 hours. HumaLOG U-100 Insulin 100 unit/mL injection USE AS DIRECTED FOR INSULIN PUMP. MAX UNITS 75 PER DAY    Insulin Pump Syringe (MiniMed Syr Res 3cc/22Gx1/2\") 3 mL misc Use as directed every 3 days    ONETOUCH ULTRA BLUE TEST STRIP strip TEST UP TO 4 TIMES DAILY. DX: E10.65    SUBCUTANEOUS INSULIN PUMP (MINIMED INSULIN INFUSION PUMP) by Does Not Apply route. No current facility-administered medications for this visit. No Known Allergies    Review of Systems: PER HPI    Physical Examination:  Blood pressure 118/68, pulse 62, height 6' 1\" (1.854 m), weight 213 lb 3.2 oz (96.7 kg).   General: pleasant, no distress, good eye contact   Neck: no carotid bruits  Cardiovascular: regular, normal rate, nl s1 and s2, no m/r/g,   Respiratory: clear bilaterally  Integumentary: no edema,   Psychiatric: normal mood and affect    Diabetic foot exam: Left Foot:   Visual Exam: normal    Pulse DP: 2+ (normal)   Filament test: normal sensation    Vibratory sensation: normal      Right Foot:   Visual Exam: normal    Pulse DP: 2+ (normal)   Filament test: normal sensation    Vibratory sensation: normal      Data Reviewed:   Component      Latest Ref Rng & Units 1/20/2023 1/20/2023 1/20/2023 1/20/2023           7:58 AM  7:58 AM  7:58 AM  7:58 AM   Glucose      70 - 99 mg/dL   285 (H)    BUN      6 - 20 mg/dL   12    Creatinine      0.76 - 1.27 mg/dL   0.83    eGFR      >59 mL/min/1.73   115    BUN/Creatinine ratio      9 - 20   14    Sodium      134 - 144 mmol/L   138    Potassium      3.5 - 5.2 mmol/L   4.4    Chloride      96 - 106 mmol/L   102    CO2      20 - 29 mmol/L   24    Calcium      8.7 - 10.2 mg/dL   9.6    Protein, total      6.0 - 8.5 g/dL   6.6    Albumin      4.0 - 5.0 g/dL   4.2    GLOBULIN, TOTAL      1.5 - 4.5 g/dL   2.4    A-G Ratio      1.2 - 2.2   1.8    Bilirubin, total      0.0 - 1.2 mg/dL   0.2    Alk. phosphatase      44 - 121 IU/L   112    AST      0 - 40 IU/L   14    ALT      0 - 44 IU/L   15    Cholesterol, total      100 - 199 mg/dL    280 (H)   Triglyceride      0 - 149 mg/dL    293 (H)   HDL Cholesterol      >39 mg/dL    42   VLDL, calculated      5 - 40 mg/dL    57 (H)   LDL, calculated      0 - 99 mg/dL    181 (H)   Creatinine, urine random      Not Estab. mg/dL  116.3     Microalbumin, urine      Not Estab. ug/mL  10.0     Microalbumin/Creat. Ratio      0 - 29 mg/g creat  9     Hemoglobin A1c, (calculated)      4.8 - 5.6 % 9.0 (H)      Estimated average glucose      mg/dL 212          Assessment/Plan:     1.  Type I (juvenile type) diabetes mellitus without mention of complication, uncontrolled [facility] his most recent Hgb A1c was 9% in 1/23 down from 9.4% in 9/22 down from 9.7% in 8/22 up from 9% in 1/22 up from 8.9% in 7/21 down from 9% in 1/21 up from 8.9% in 7/20 up from 8.1% in 10/19 up from 7.7% in 12/18 down from 7.8% in 7/18 up from 7.4% in 2/18 down from 8% in 9/17 down from 8.2% in 4/17 down from 8.4% in 11/16 down from 8.5% in 7/15 up from 7.9% in 3/15. I made his carb ratio more aggressive to help with post-meal spikes. - cont current pump settings aside from change below  - check bs 4 times per day due to fluctuating sugars on an insulin pump  - foot exam done 1/23  - optho 6/17  - microalbumin nl 1/23  - check Hgb A1c and bmp prior to next visit  - his BP was at goal < 140/90 not on any meds  - TSH normal 7/15 and 1.61 in 1/22       2. Other and unspecified hyperlipidemia: Given DM, Goal LDL < 100, non-HDL < 130, and TG < 150.  in 3/15 and 125 in 7/15. Down to 122 in 11/16 but up to 161 in 4/17 and 158 in 9/17. Down to 139 in 2/18 and in 10/19 and up to 160 in 7/20 and 169 in 1/21 and 180 in 7/21 and 170 in 1/22 and 186 in 8/22 and 167 in 9/22 and 181 in 1/23 but 10 year risk of CV disease is 0.5% so will hold on statin and focus on diet. - diet control for now  - check lipids prior to next visit            Patient Instructions   1) Current settings are as follows:  - basal: 12a: 2.05, 9a: 2.0  - Carb ratio: 12a: 8  - sensitivity: 12a: 20, 6a: 20, 10p: 20  - target:   - active insulin time: 3 hours    I made your carb ratio more aggressive going from 10 to 8. Check some 2 hour after meal readings to see if you are still spiking over 180 and if so, you may need to change this to 7 or even 6. Do your best to check more frequently before you eat so you can correct for higher sugars before the meal.    2) Your Hemoglobin A1c (3 month test of blood sugar) has come back down from 9.7% to 9% and I hope your next one will be better. 3) Your triglycerides (short term fats) were high due to what you ate the night before the lab draw. 4) Your liver and kidney and blood pressure and urine are all normal.        Follow-up and Dispositions    Return in about 6 months (around 7/31/2023).                Copy sent to:  Tonja To, NP

## 2023-01-31 NOTE — PATIENT INSTRUCTIONS
1) Current settings are as follows:  - basal: 12a: 2.05, 9a: 2.0  - Carb ratio: 12a: 8  - sensitivity: 12a: 20, 6a: 20, 10p: 20  - target:   - active insulin time: 3 hours    I made your carb ratio more aggressive going from 10 to 8. Check some 2 hour after meal readings to see if you are still spiking over 180 and if so, you may need to change this to 7 or even 6. Do your best to check more frequently before you eat so you can correct for higher sugars before the meal.    2) Your Hemoglobin A1c (3 month test of blood sugar) has come back down from 9.7% to 9% and I hope your next one will be better. 3) Your triglycerides (short term fats) were high due to what you ate the night before the lab draw.     4) Your liver and kidney and blood pressure and urine are all normal.

## 2023-04-22 DIAGNOSIS — E10.65 UNCONTROLLED TYPE 1 DIABETES MELLITUS WITH HYPERGLYCEMIA (HCC): Primary | ICD-10-CM

## 2023-04-22 DIAGNOSIS — E78.5 HYPERLIPIDEMIA WITH TARGET LOW DENSITY LIPOPROTEIN (LDL) CHOLESTEROL LESS THAN 100 MG/DL: ICD-10-CM

## 2023-04-23 DIAGNOSIS — E78.5 HYPERLIPIDEMIA WITH TARGET LOW DENSITY LIPOPROTEIN (LDL) CHOLESTEROL LESS THAN 100 MG/DL: ICD-10-CM

## 2023-04-23 DIAGNOSIS — E10.65 UNCONTROLLED TYPE 1 DIABETES MELLITUS WITH HYPERGLYCEMIA (HCC): Primary | ICD-10-CM

## 2023-05-23 RX ORDER — INSULIN LISPRO 100 [IU]/ML
INJECTION, SOLUTION INTRAVENOUS; SUBCUTANEOUS
COMMUNITY
Start: 2022-09-16

## 2023-07-15 DIAGNOSIS — E78.5 HYPERLIPIDEMIA WITH TARGET LOW DENSITY LIPOPROTEIN (LDL) CHOLESTEROL LESS THAN 100 MG/DL: ICD-10-CM

## 2023-07-15 DIAGNOSIS — E10.65 UNCONTROLLED TYPE 1 DIABETES MELLITUS WITH HYPERGLYCEMIA (HCC): ICD-10-CM

## 2023-08-21 RX ORDER — INSULIN LISPRO 100 [IU]/ML
INJECTION, SOLUTION INTRAVENOUS; SUBCUTANEOUS
Qty: 70 ML | Refills: 3 | Status: SHIPPED | OUTPATIENT
Start: 2023-08-21

## 2023-11-27 RX ORDER — INFUSION SET FOR INSULIN PUMP
INFUSION SETS-PARAPHERNALIA MISCELLANEOUS
Qty: 45 EACH | Refills: 3 | Status: SHIPPED | OUTPATIENT
Start: 2023-11-27

## 2024-01-22 ENCOUNTER — TELEMEDICINE (OUTPATIENT)
Age: 39
End: 2024-01-22

## 2024-01-22 DIAGNOSIS — E10.65 TYPE 1 DIABETES MELLITUS WITH HYPERGLYCEMIA (HCC): Primary | ICD-10-CM

## 2024-01-22 DIAGNOSIS — E78.5 HYPERLIPIDEMIA WITH TARGET LOW DENSITY LIPOPROTEIN (LDL) CHOLESTEROL LESS THAN 100 MG/DL: ICD-10-CM

## 2024-01-22 PROCEDURE — 99214 OFFICE O/P EST MOD 30 MIN: CPT | Performed by: INTERNAL MEDICINE

## 2024-01-22 RX ORDER — TADALAFIL 5 MG/1
5 TABLET ORAL DAILY
COMMUNITY
Start: 2010-06-21

## 2024-01-22 NOTE — PROGRESS NOTES
Chief Complaint   Patient presents with    Diabetes     MyChart pt instructed to wait for link    Other     PCP and pharmacy confirmed        **THIS IS A VIRTUAL VISIT VIA A VIDEO SYNCHRONOUS DISCUSSION. PATIENT AGREED TO HAVE THEIR CARE DELIVERED OVER A MYCHART VIDEO VISIT IN PLACE OF THEIR REGULARLY SCHEDULED OFFICE VISIT**    History of Present Illness: Shane Rangel is a 38 y.o. male here for follow up of diabetes.  Weight down 3-6 lbs since last visit in 1/23.  No major change to health since last visit.  Hasn't made any changes to his pump settings.  Has been a little better checking his sugars during the day.  His fasting sugars can be controlled in the  range aside from when he eats late and then can be higher in the 200-250 range.  Has been eating more packaged meals during the day and has done some intermittent fasting.    Current settings are as follows:  - basal: 12a: 2.05, 7a: 2.0  - Carb ratio: 12a: 8  - sensitivity: 12a: 20, 6a: 20, 10p: 20  - target:   - active insulin time: 3 hours    Current Outpatient Medications   Medication Sig    tadalafil (CIALIS) 5 MG tablet 1 tablet daily    Insulin Infusion Pump Supplies (MINIMED BOONE ADVANCE INFUSE SET) MISC USE AS DIRECTED WITH The [employer] INSULIN PUMP. CHANGE EVERY 48 HOURS    HUMALOG 100 UNIT/ML SOLN injection vial USE AS DIRECTED FOR INSULIN PUMP. MAXIMUM 75 UNITS PER DAY.     No current facility-administered medications for this visit.     No Known Allergies    Review of Systems: PER HPI    Physical Examination:  - GENERAL: NCAT, Appears well nourished   - EYES: EOMI, non-icteric, no proptosis   - Ear/Nose/Throat: NCAT, no visible inflammation or masses   - CARDIOVASCULAR: no cyanosis, no visible JVD   - RESPIRATORY: respiratory effort normal without any distress or labored breathing   - MUSCULOSKELETAL: Normal ROM of neck and upper extremities observed   - SKIN: No rash on face  - NEUROLOGIC:  No facial asymmetry (Cranial nerve 7 motor

## 2024-01-22 NOTE — PATIENT INSTRUCTIONS
1) I put an order directly into the [employer] system to repeat your labs now so just ask for the order under my name and I will be in touch with the results.    Please fast for your labs.  Don't eat anything after midnight.  However, drink at least 6-8 oz of water the morning of the lab draw to avoid dehydration and to allow for the tech to find your vein easier.    Be prepared to give a urine sample to test for any effect of the diabetes on your kidneys.    2) Eat some prepackaged meals for dinner and then test 2 hours later to see if your sugar is over 180 and if so, then I would add a carb ratio of 7 at 5pm to see if this helps with post-dinner spikes and if still happening, then change to 6.    3) Please come for a follow up visit on 7/9/24 at 2:10pm in our [place] office.

## 2024-07-08 LAB — HBA1C MFR BLD: 9.7 % (ref 4.8–5.6)

## 2024-07-09 ENCOUNTER — TELEPHONE (OUTPATIENT)
Age: 39
End: 2024-07-09

## 2024-07-09 ENCOUNTER — OFFICE VISIT (OUTPATIENT)
Age: 39
End: 2024-07-09
Payer: COMMERCIAL

## 2024-07-09 VITALS
HEART RATE: 65 BPM | BODY MASS INDEX: 26.96 KG/M2 | WEIGHT: 203.4 LBS | SYSTOLIC BLOOD PRESSURE: 132 MMHG | HEIGHT: 73 IN | DIASTOLIC BLOOD PRESSURE: 76 MMHG

## 2024-07-09 DIAGNOSIS — E78.5 HYPERLIPIDEMIA WITH TARGET LOW DENSITY LIPOPROTEIN (LDL) CHOLESTEROL LESS THAN 100 MG/DL: ICD-10-CM

## 2024-07-09 DIAGNOSIS — E10.65 TYPE 1 DIABETES MELLITUS WITH HYPERGLYCEMIA (HCC): Primary | ICD-10-CM

## 2024-07-09 LAB
ALBUMIN SERPL-MCNC: 4.4 G/DL (ref 4.1–5.1)
ALBUMIN/CREAT UR: 3 MG/G CREAT (ref 0–29)
ALP SERPL-CCNC: 120 IU/L (ref 44–121)
ALT SERPL-CCNC: 15 IU/L (ref 0–44)
AST SERPL-CCNC: 21 IU/L (ref 0–40)
BILIRUB SERPL-MCNC: 0.3 MG/DL (ref 0–1.2)
BUN SERPL-MCNC: 16 MG/DL (ref 6–24)
BUN/CREAT SERPL: 20 (ref 9–20)
CALCIUM SERPL-MCNC: 9 MG/DL (ref 8.7–10.2)
CHLORIDE SERPL-SCNC: 101 MMOL/L (ref 96–106)
CHOLEST SERPL-MCNC: 257 MG/DL (ref 100–199)
CO2 SERPL-SCNC: 25 MMOL/L (ref 20–29)
CREAT SERPL-MCNC: 0.79 MG/DL (ref 0.76–1.27)
CREAT UR-MCNC: 206.2 MG/DL
EGFRCR SERPLBLD CKD-EPI 2021: 115 ML/MIN/1.73
GLOBULIN SER CALC-MCNC: 2.1 G/DL (ref 1.5–4.5)
GLUCOSE SERPL-MCNC: 253 MG/DL (ref 70–99)
HDLC SERPL-MCNC: 42 MG/DL
IMP & REVIEW OF LAB RESULTS: NORMAL
LDLC SERPL CALC-MCNC: 146 MG/DL (ref 0–99)
Lab: NORMAL
MICROALBUMIN UR-MCNC: 6.9 UG/ML
POTASSIUM SERPL-SCNC: 4.5 MMOL/L (ref 3.5–5.2)
PROT SERPL-MCNC: 6.5 G/DL (ref 6–8.5)
SODIUM SERPL-SCNC: 140 MMOL/L (ref 134–144)
TRIGL SERPL-MCNC: 375 MG/DL (ref 0–149)
VLDLC SERPL CALC-MCNC: 69 MG/DL (ref 5–40)

## 2024-07-09 PROCEDURE — 3046F HEMOGLOBIN A1C LEVEL >9.0%: CPT | Performed by: INTERNAL MEDICINE

## 2024-07-09 PROCEDURE — 99214 OFFICE O/P EST MOD 30 MIN: CPT | Performed by: INTERNAL MEDICINE

## 2024-07-09 NOTE — TELEPHONE ENCOUNTER
7/9/2024  2:13 PM    Kay from [facility] left message at 1:00 stating a PA is needed for patient's rx.  Requesting call back at [phone].    Thanks,   Sara

## 2024-07-09 NOTE — PATIENT INSTRUCTIONS
1) Amie Gaines is the Dexcom representative.  Please contact her at [phone] for further details on this product if you are having trouble getting this through your insurance.    2) You can try Mario Whitfield at [facility] [phone].    3) Your BUN and creatinine are markers of kidney function.  Your values are normal.    4) ALT and AST are markers of liver function.  Your values are normal.    5) Your urine test is normal.    6) Your Hemoglobin A1c (3 month test of blood sugar) remains above 9% and I'm hopeful with starting the new omniopod with Dexcom that we can get better control.  If you need further training please let me know and I can set this up for you.    Current settings are as follows:  - basal: 12a: 2.05, 7a: 2.0  - Carb ratio: 12a: 8  - sensitivity (correction factor): 12a: 20, 6a: 20, 10p: 20  - target:   - active insulin time: 3 hours    7) Your cholesterol is elevated and the triglycerides (short term fats) were likely higher from the wine the night before but we'll watch this for now.

## 2024-07-09 NOTE — PROGRESS NOTES
0.76 - 1.27 mg/dL 0.79    Est, Glom Filt Rate      >59 mL/min/1.73 115    BUN/Creatinine Ratio      9 - 20  20    Sodium      134 - 144 mmol/L 140    Potassium reflex Magnesium      3.5 - 5.2 mmol/L 4.5    CHLORIDE      96 - 106 mmol/L 101    CO2      20 - 29 mmol/L 25    Calcium      8.7 - 10.2 mg/dL 9.0    Total Protein      6.0 - 8.5 g/dL 6.5    Albumin      4.1 - 5.1 g/dL 4.4    Globulin, Total      1.5 - 4.5 g/dL 2.1    BILIRUBIN TOTAL      0.0 - 1.2 mg/dL 0.3    Alkaline Phosphatase      44 - 121 IU/L 120    AST      0 - 40 IU/L 21    ALT      0 - 44 IU/L 15    Cholesterol, Total      100 - 199 mg/dL 257 (H)    Triglycerides      0 - 149 mg/dL 375 (H)    HDL      >39 mg/dL 42    VLDL      5 - 40 mg/dL 69 (H)    LDL Cholesterol      0 - 99 mg/dL 146 (H)    Creatinine, Ur      Not Estab. mg/dL 206.2    Albumin, U      Not Estab. ug/mL 6.9    Microalb/Creat Ratio POC      0 - 29 mg/g creat 3    Hemoglobin A1C, (Calculated)      4.8 - 5.6 % 9.7 (H)       The 10-year ASCVD risk score (Mignon CHAMBERS, et al., 2019) is: 4.8%    Values used to calculate the score:      Age: 39 years      Sex: Male      Is Non- : No      Diabetic: Yes      Tobacco smoker: No      Systolic Blood Pressure: 132 mmHg      Is BP treated: No      HDL Cholesterol: 42 mg/dL      Total Cholesterol: 257 mg/dL     Assessment/Plan:     1. Type I (juvenile type) diabetes mellitus without mention of complication, uncontrolled (HCC): his most recent Hgb A1c was 9.7% in 7/24 up from 9% in 1/23 down from 9.4% in 9/22 down from 9.7% in 8/22 up from 9% in 1/22 up from 8.9% in 7/21 down from 9% in 1/21 up from 8.9% in 7/20 up from 8.1% in 10/19 up from 7.7% in 12/18 down from 7.8% in 7/18 up from 7.4% in 2/18 down from 8% in 9/17 down from 8.2% in 4/17 down from 8.4% in 11/16 down from 8.5% in 7/15 up from 7.9% in 3/15.  I don't have any data to know where to make changes.  Hopefully with changing to omnipod 5 with dexcom, having a

## 2024-07-11 ENCOUNTER — TELEPHONE (OUTPATIENT)
Age: 39
End: 2024-07-11

## 2024-07-11 NOTE — TELEPHONE ENCOUNTER
PA initiated through [email or web address] for Omnipod 5 G6 system    Omnipod 5 G6 Intro kit approved 07/10/204 - 08/09/2024 PA #: [id number]. Pt notified via [employer]    Omnipod 5 G6 pods approved 07/10/2024 - 07/10/2025 PA #: [id number]. Pt notified via [employer]

## 2024-07-29 RX ORDER — ACYCLOVIR 400 MG/1
TABLET ORAL
Qty: 9 EACH | Refills: 3 | Status: SHIPPED | OUTPATIENT
Start: 2024-07-29

## 2024-07-30 ENCOUNTER — TELEPHONE (OUTPATIENT)
Age: 39
End: 2024-07-30

## 2024-07-30 NOTE — TELEPHONE ENCOUNTER
PA initiated through [email or web address] for Dexcom G7 sensor    Dexcom G7 sensor approved 07/30/2024 - 07/30/2025 PA #: [id number]. Pt notified via [employer]

## 2024-08-05 RX ORDER — INSULIN PMP CART,AUT,G6/7,CNTR
EACH SUBCUTANEOUS
Qty: 10 EACH | Refills: 11 | Status: SHIPPED | OUTPATIENT
Start: 2024-08-05

## 2024-08-31 RX ORDER — INSULIN LISPRO 100 [IU]/ML
INJECTION, SOLUTION INTRAVENOUS; SUBCUTANEOUS
Qty: 70 ML | Refills: 3 | Status: SHIPPED | OUTPATIENT
Start: 2024-08-31

## 2024-11-18 LAB — HBA1C MFR BLD HPLC: 7.4 %

## 2024-12-26 DIAGNOSIS — E10.65 TYPE 1 DIABETES MELLITUS WITH HYPERGLYCEMIA (HCC): ICD-10-CM

## 2024-12-26 DIAGNOSIS — E78.5 HYPERLIPIDEMIA WITH TARGET LOW DENSITY LIPOPROTEIN (LDL) CHOLESTEROL LESS THAN 100 MG/DL: ICD-10-CM

## 2025-01-13 ENCOUNTER — TELEMEDICINE (OUTPATIENT)
Age: 40
End: 2025-01-13
Payer: COMMERCIAL

## 2025-01-13 DIAGNOSIS — E10.65 TYPE 1 DIABETES MELLITUS WITH HYPERGLYCEMIA (HCC): Primary | ICD-10-CM

## 2025-01-13 DIAGNOSIS — E78.5 HYPERLIPIDEMIA WITH TARGET LOW DENSITY LIPOPROTEIN (LDL) CHOLESTEROL LESS THAN 100 MG/DL: ICD-10-CM

## 2025-01-13 PROCEDURE — 95251 CONT GLUC MNTR ANALYSIS I&R: CPT | Performed by: INTERNAL MEDICINE

## 2025-01-13 PROCEDURE — 99214 OFFICE O/P EST MOD 30 MIN: CPT | Performed by: INTERNAL MEDICINE

## 2025-01-13 RX ORDER — ROSUVASTATIN CALCIUM 5 MG/1
5 TABLET, COATED ORAL DAILY
COMMUNITY
Start: 2025-01-07

## 2025-01-13 NOTE — PATIENT INSTRUCTIONS
1) Current settings are as follows:  - basal: 12a: 2.05, 7a: 2.0  - Carb ratio: 12a: 6  - sensitivity: 12a: 20, 6a: 20, 10p: 20  - target: 110-120  - active insulin time: 3 hours    We made your carb ratio more aggressive to help with post-meal spikes.  Give this 1 week and if you are using some prepackaged meals to ensure your carb intake is correct and are still spiking over 180 at 2 hours after eating, then change your carb ratio to 5.5 or even 5 if needed.  Let me know if you want me to look at your dexcom data to review any trends.    2) Your LDL (bad cholesterol) is 179 and goal is under 100.  Therefore to get more data on how aggressive to be with your cholesterol lowering I think it's worth ordering a coronary calcium score which is a CT scan to evaluate your plaque build up in your heart. I will be in touch with the results.  Hold your rosuvastatin until we get these results back.  Please call the Patient Care team at [phone] to schedule this appointment at your earliest convenience    3) Please come for a follow up visit on 7/19/25 at 9:50am in our [place] office.    4) I put an order directly into the [employer] system to repeat your labs in the 1-2 weeks prior to your next visit so just ask for the order under my name and make a note on your calendar to have these done at that time.  This order was also put directly into the [employer] portal.  Go under menu and my record and scroll down to upcoming tests and procedures and you are welcome to print this off or bring a copy of the order using the [employer] alin on your phone to the lab. Thanks!

## 2025-01-13 NOTE — PROGRESS NOTES
Chief Complaint   Patient presents with    Diabetes     MyChart pt instructed to wait for link [phone]  PCP and pharmacy confirmed  Pt consented to use of DAE        **THIS IS A VIRTUAL VISIT VIA A VIDEO SYNCHRONOUS DISCUSSION. PATIENT AGREED TO HAVE THEIR CARE DELIVERED OVER A [employer] VIDEO VISIT IN PLACE OF THEIR REGULARLY SCHEDULED OFFICE VISIT**    History of Present Illness: Shane Rangel is a 39 y.o. male here for follow up of diabetes. Review of his mackenzie/dexcom data over the past 90 days shows 50% in range, 33% high, 17% very high, 0% low and <1% very low.    Current settings are as follows:  - basal: 12a: 2.05, 7a: 2.0  - Carb ratio: 12a: 8  - sensitivity: 12a: 20, 6a: 20, 10p: 20  - target: 110-120  - active insulin time: 3 hours    he has the following indications to continue treatment with Dexcom:  1) he has type 1 diabetes (E10.65) and is on an intensive insulin regimen with an insulin pump   2) he tests his blood sugar 4 times per day and makes treatment decisions off his blood sugar readings and sensor readings  3) he requires frequent adjustments to his insulin pump settings based on his sensor readings  4) he has benefited from therapeutic continuous glucose monitoring and I recommend that he continue with this  5) he is seen in my office every 6 months          History of Present Illness  The patient is a 39-year-old male here for a follow-up of diabetes via virtual visit.    He has been managing his diabetes with the OmniPod system and Dexcom G7 since July 2024. He has not made any modifications to the settings of these devices. He is currently utilizing the auto mode setting on the pump. He has observed a pattern of elevated blood sugar levels during the night, typically between 10:40 PM and 3:15 AM. His dietary habits include late dinners, often consumed between 8:00 PM and 9:00 PM, and he acknowledges the possibility of underestimating his carbohydrate intake. He reports that his blood

## 2025-05-31 RX ORDER — ACYCLOVIR 400 MG/1
TABLET ORAL
Qty: 9 EACH | Refills: 3 | Status: SHIPPED | OUTPATIENT
Start: 2025-05-31

## 2025-06-11 RX ORDER — INSULIN PMP CART,AUT,G6/7,CNTR
EACH SUBCUTANEOUS
Qty: 10 EACH | Refills: 11 | Status: SHIPPED | OUTPATIENT
Start: 2025-06-11

## 2025-06-14 ENCOUNTER — TELEPHONE (OUTPATIENT)
Age: 40
End: 2025-06-14

## 2025-06-14 NOTE — TELEPHONE ENCOUNTER
Approved today by [employer] 2017  PA Case: [id number], Status: Approved, Coverage Starts on: 6/14/2025 12:00:00 AM, Coverage Ends on: 6/14/2026 12:00:00 AM.

## 2025-06-14 NOTE — TELEPHONE ENCOUNTER
I submitted a prior authorization for omnipod G5 through [employer] and am currently waiting a response from the system.

## 2025-07-03 DIAGNOSIS — E78.5 HYPERLIPIDEMIA WITH TARGET LOW DENSITY LIPOPROTEIN (LDL) CHOLESTEROL LESS THAN 100 MG/DL: ICD-10-CM

## 2025-07-03 DIAGNOSIS — E10.65 TYPE 1 DIABETES MELLITUS WITH HYPERGLYCEMIA (HCC): ICD-10-CM

## 2025-07-04 ENCOUNTER — TELEPHONE (OUTPATIENT)
Age: 40
End: 2025-07-04

## 2025-07-04 NOTE — TELEPHONE ENCOUNTER
I submitted a prior authorization for Dexcom G7 sensors through [employer] and received the following response:    The member recently filled this medication and will be able to return for their next refill according to their plan limits.

## 2025-07-15 ENCOUNTER — HOSPITAL ENCOUNTER (OUTPATIENT)
Facility: HOSPITAL | Age: 40
Discharge: HOME | End: 2025-07-18

## 2025-07-16 LAB
ALBUMIN SERPL-MCNC: 3.9 G/DL (ref 3.5–5)
ALBUMIN/GLOB SERPL: 1.5 (ref 1.1–2.2)
ALP SERPL-CCNC: 93 U/L (ref 45–117)
ALT SERPL-CCNC: 54 U/L (ref 12–78)
ANION GAP SERPL CALC-SCNC: 3 MMOL/L (ref 2–12)
AST SERPL-CCNC: 26 U/L (ref 15–37)
BILIRUB SERPL-MCNC: 0.5 MG/DL (ref 0.2–1)
BUN SERPL-MCNC: 9 MG/DL (ref 6–20)
BUN/CREAT SERPL: 11 (ref 12–20)
CALCIUM SERPL-MCNC: 9.1 MG/DL (ref 8.5–10.1)
CHLORIDE SERPL-SCNC: 105 MMOL/L (ref 97–108)
CHOLEST SERPL-MCNC: 191 MG/DL
CO2 SERPL-SCNC: 30 MMOL/L (ref 21–32)
CREAT SERPL-MCNC: 0.79 MG/DL (ref 0.7–1.3)
CREAT UR-MCNC: 101 MG/DL
EST. AVERAGE GLUCOSE BLD GHB EST-MCNC: 154 MG/DL
GLOBULIN SER CALC-MCNC: 2.6 G/DL (ref 2–4)
GLUCOSE SERPL-MCNC: 124 MG/DL (ref 65–100)
HBA1C MFR BLD: 7 % (ref 4–5.6)
HDLC SERPL-MCNC: 61 MG/DL
HDLC SERPL: 3.1 (ref 0–5)
LDLC SERPL CALC-MCNC: 114 MG/DL (ref 0–100)
MICROALBUMIN UR-MCNC: <0.5 MG/DL
MICROALBUMIN/CREAT UR-RTO: <5 MG/G (ref 0–30)
POTASSIUM SERPL-SCNC: 4.1 MMOL/L (ref 3.5–5.1)
PROT SERPL-MCNC: 6.5 G/DL (ref 6.4–8.2)
SODIUM SERPL-SCNC: 138 MMOL/L (ref 136–145)
TRIGL SERPL-MCNC: 80 MG/DL
VLDLC SERPL CALC-MCNC: 16 MG/DL

## 2025-07-17 ENCOUNTER — OFFICE VISIT (OUTPATIENT)
Age: 40
End: 2025-07-17

## 2025-07-17 VITALS
DIASTOLIC BLOOD PRESSURE: 73 MMHG | SYSTOLIC BLOOD PRESSURE: 130 MMHG | HEART RATE: 70 BPM | HEIGHT: 73 IN | BODY MASS INDEX: 27.43 KG/M2 | WEIGHT: 207 LBS

## 2025-07-17 DIAGNOSIS — E78.5 HYPERLIPIDEMIA WITH TARGET LOW DENSITY LIPOPROTEIN (LDL) CHOLESTEROL LESS THAN 100 MG/DL: ICD-10-CM

## 2025-07-17 DIAGNOSIS — E10.65 TYPE 1 DIABETES MELLITUS WITH HYPERGLYCEMIA (HCC): Primary | ICD-10-CM

## 2025-07-17 RX ORDER — SILDENAFIL CITRATE 20 MG/1
20 TABLET ORAL PRN
COMMUNITY

## 2025-07-17 NOTE — PATIENT INSTRUCTIONS
1) Your estimated A1c on your dexcom is 8% but your [facility] value was 7% and you do appear to be having post-meal spikes and had not changed your carb ratio from 8 to 6 so we did this and this should help with spikes over 180 2 hours after you eat.      Give this 1 week and if you are using some prepackaged meals to ensure your carb intake is correct and are still spiking over 180 at 2 hours after eating, then change your carb ratio to 5.5 or even 5 if needed.  Let me know if you want me to look at your dexcom data to review any trends.    2) BUN and creatinine are markers of kidney function.  Your values are normal.  I'm not concerned by the BUN/creatinine ratio being low when the individual tests are normal so there are no issues with your kidneys at this time.    3) ALT and AST are markers of liver function.  Your values are normal.    4) Your cholesterol is much lower on the rosuvastatin and we'll try to get your LDL (bad cholesterol) under 100 so do your best to get the pill everyday.  Therefore to get more data on how aggressive to be with your cholesterol lowering I think it's worth ordering a coronary calcium score which is a CT scan to evaluate your plaque build up in your heart. I will be in touch with the results which may be after 8/4/25 when I return from vacation. Please call the Patient Care team at [phone] to schedule this appointment at your earliest convenience    5) Microalbumin/creatinine ratio is a marker of the amount of protein in your urine.  Goal is less than 30.  Your value is normal. This indicates that your kidneys are not being affected by your diabetes and/or blood pressure.        
Pt has expressive aphasia at baseline; patient unable to answer at this time

## 2025-07-17 NOTE — PROGRESS NOTES
Chief Complaint   Patient presents with    Diabetes    Follow-up    Other     Patient consents to DAE  PCP and Pharmacy verified        History of Present Illness: Shane Rangel is a 40 y.o. male here for follow up of diabetes.  Weight up 4 lbs since last visit in person in 7/24.  Review of his dexcom data over the past 90 days shows 47% in range, 31% high, 22% very high, 0% low and <1% very low.      Current settings are as follows:  - basal: 12a: 2.05, 7a: 2.0  - Carb ratio: 12a: 6  - sensitivity: 12a: 20, 6a: 20, 10p: 20  - target: 110-120  - active insulin time: 3 hours    he has the following indications to continue treatment with Dexcom:  1) he has type 1 diabetes (E10.65) and is on an intensive insulin regimen with an insulin pump   2) he tests his blood sugar 4 times per day and makes treatment decisions off his blood sugar readings and sensor readings  3) he requires frequent adjustments to his insulin pump settings based on his sensor readings  4) he has benefited from therapeutic continuous glucose monitoring and I recommend that he continue with this  5) he is seen in my office every 6 months        History of Present Illness  The patient is a 40-year-old male here for a follow-up of diabetes.    He has been managing diabetes with OmniPod and Dexcom, which he reports as effective. During the last visit in January 2025, a change in carb ratio to six was recommended, but he has continued using a ratio of eight and changed this on his pump. Blood sugar levels occasionally rise above 200 overnight or post-breakfast. The Dexcom data shows he is in range 47% of the time, with an estimated A1c of eight, while the lab value was seven. He does not perform fingerstick tests.     He reports no numbness or tingling in his feet. An eye exam was conducted last year, and he plans to schedule another one next month. No bleeding or swelling in the back of the eye was noted during the last exam. He reports no leg swelling,

## 2025-08-04 RX ORDER — INSULIN LISPRO 100 [IU]/ML
INJECTION, SOLUTION INTRAVENOUS; SUBCUTANEOUS
Qty: 70 ML | Refills: 3 | Status: SHIPPED | OUTPATIENT
Start: 2025-08-04